# Patient Record
Sex: FEMALE | Race: BLACK OR AFRICAN AMERICAN | NOT HISPANIC OR LATINO
[De-identification: names, ages, dates, MRNs, and addresses within clinical notes are randomized per-mention and may not be internally consistent; named-entity substitution may affect disease eponyms.]

---

## 2017-10-26 ENCOUNTER — APPOINTMENT (OUTPATIENT)
Dept: OPHTHALMOLOGY | Facility: CLINIC | Age: 67
End: 2017-10-26
Payer: MEDICARE

## 2017-10-26 DIAGNOSIS — H04.123 DRY EYE SYNDROME OF BILATERAL LACRIMAL GLANDS: ICD-10-CM

## 2017-10-26 PROCEDURE — 92014 COMPRE OPH EXAM EST PT 1/>: CPT

## 2018-01-06 ENCOUNTER — EMERGENCY (EMERGENCY)
Facility: HOSPITAL | Age: 68
LOS: 1 days | Discharge: HOME | End: 2018-01-06
Attending: EMERGENCY MEDICINE | Admitting: EMERGENCY MEDICINE
Payer: MEDICARE

## 2018-01-06 VITALS
DIASTOLIC BLOOD PRESSURE: 97 MMHG | SYSTOLIC BLOOD PRESSURE: 176 MMHG | OXYGEN SATURATION: 100 % | HEART RATE: 94 BPM | RESPIRATION RATE: 18 BRPM | TEMPERATURE: 99 F

## 2018-01-06 VITALS
DIASTOLIC BLOOD PRESSURE: 90 MMHG | TEMPERATURE: 98 F | SYSTOLIC BLOOD PRESSURE: 171 MMHG | RESPIRATION RATE: 18 BRPM | HEART RATE: 88 BPM | OXYGEN SATURATION: 99 %

## 2018-01-06 LAB
ALBUMIN SERPL ELPH-MCNC: 4 G/DL — SIGNIFICANT CHANGE UP (ref 3.3–5)
ALP SERPL-CCNC: 58 U/L — SIGNIFICANT CHANGE UP (ref 40–120)
ALT FLD-CCNC: 23 U/L RC — SIGNIFICANT CHANGE UP (ref 10–45)
ANION GAP SERPL CALC-SCNC: 15 MMOL/L — SIGNIFICANT CHANGE UP (ref 5–17)
AST SERPL-CCNC: 25 U/L — SIGNIFICANT CHANGE UP (ref 10–40)
BASOPHILS # BLD AUTO: 0.1 K/UL — SIGNIFICANT CHANGE UP (ref 0–0.2)
BASOPHILS NFR BLD AUTO: 2.1 % — HIGH (ref 0–2)
BILIRUB SERPL-MCNC: 0.2 MG/DL — SIGNIFICANT CHANGE UP (ref 0.2–1.2)
BUN SERPL-MCNC: 42 MG/DL — HIGH (ref 7–23)
CALCIUM SERPL-MCNC: 10 MG/DL — SIGNIFICANT CHANGE UP (ref 8.4–10.5)
CHLORIDE SERPL-SCNC: 103 MMOL/L — SIGNIFICANT CHANGE UP (ref 96–108)
CK MB CFR SERPL CALC: 2.8 NG/ML — SIGNIFICANT CHANGE UP (ref 0–3.8)
CK SERPL-CCNC: 86 U/L — SIGNIFICANT CHANGE UP (ref 25–170)
CO2 SERPL-SCNC: 22 MMOL/L — SIGNIFICANT CHANGE UP (ref 22–31)
CREAT SERPL-MCNC: 2.36 MG/DL — HIGH (ref 0.5–1.3)
EOSINOPHIL # BLD AUTO: 0.3 K/UL — SIGNIFICANT CHANGE UP (ref 0–0.5)
EOSINOPHIL NFR BLD AUTO: 7.1 % — HIGH (ref 0–6)
GLUCOSE SERPL-MCNC: 99 MG/DL — SIGNIFICANT CHANGE UP (ref 70–99)
HCT VFR BLD CALC: 34.2 % — LOW (ref 34.5–45)
HGB BLD-MCNC: 11.7 G/DL — SIGNIFICANT CHANGE UP (ref 11.5–15.5)
LYMPHOCYTES # BLD AUTO: 1.4 K/UL — SIGNIFICANT CHANGE UP (ref 1–3.3)
LYMPHOCYTES # BLD AUTO: 35.1 % — SIGNIFICANT CHANGE UP (ref 13–44)
MCHC RBC-ENTMCNC: 30 PG — SIGNIFICANT CHANGE UP (ref 27–34)
MCHC RBC-ENTMCNC: 34.2 GM/DL — SIGNIFICANT CHANGE UP (ref 32–36)
MCV RBC AUTO: 87.7 FL — SIGNIFICANT CHANGE UP (ref 80–100)
MONOCYTES # BLD AUTO: 0.6 K/UL — SIGNIFICANT CHANGE UP (ref 0–0.9)
MONOCYTES NFR BLD AUTO: 14.1 % — HIGH (ref 2–14)
NEUTROPHILS # BLD AUTO: 1.7 K/UL — LOW (ref 1.8–7.4)
NEUTROPHILS NFR BLD AUTO: 41.6 % — LOW (ref 43–77)
PLATELET # BLD AUTO: 252 K/UL — SIGNIFICANT CHANGE UP (ref 150–400)
POTASSIUM SERPL-MCNC: 4.5 MMOL/L — SIGNIFICANT CHANGE UP (ref 3.5–5.3)
POTASSIUM SERPL-SCNC: 4.5 MMOL/L — SIGNIFICANT CHANGE UP (ref 3.5–5.3)
PROT SERPL-MCNC: 7.3 G/DL — SIGNIFICANT CHANGE UP (ref 6–8.3)
RBC # BLD: 3.9 M/UL — SIGNIFICANT CHANGE UP (ref 3.8–5.2)
RBC # FLD: 13.3 % — SIGNIFICANT CHANGE UP (ref 10.3–14.5)
SODIUM SERPL-SCNC: 140 MMOL/L — SIGNIFICANT CHANGE UP (ref 135–145)
TROPONIN T SERPL-MCNC: <0.01 NG/ML — SIGNIFICANT CHANGE UP (ref 0–0.06)
WBC # BLD: 4.1 K/UL — SIGNIFICANT CHANGE UP (ref 3.8–10.5)
WBC # FLD AUTO: 4.1 K/UL — SIGNIFICANT CHANGE UP (ref 3.8–10.5)

## 2018-01-06 PROCEDURE — 73130 X-RAY EXAM OF HAND: CPT | Mod: 26,LT

## 2018-01-06 PROCEDURE — 82553 CREATINE MB FRACTION: CPT

## 2018-01-06 PROCEDURE — 99285 EMERGENCY DEPT VISIT HI MDM: CPT | Mod: 25

## 2018-01-06 PROCEDURE — 73130 X-RAY EXAM OF HAND: CPT

## 2018-01-06 PROCEDURE — 99285 EMERGENCY DEPT VISIT HI MDM: CPT

## 2018-01-06 PROCEDURE — 93005 ELECTROCARDIOGRAM TRACING: CPT

## 2018-01-06 PROCEDURE — 82550 ASSAY OF CK (CPK): CPT

## 2018-01-06 PROCEDURE — 85027 COMPLETE CBC AUTOMATED: CPT

## 2018-01-06 PROCEDURE — 84484 ASSAY OF TROPONIN QUANT: CPT

## 2018-01-06 PROCEDURE — 80053 COMPREHEN METABOLIC PANEL: CPT

## 2018-01-06 RX ORDER — ACETAMINOPHEN 500 MG
650 TABLET ORAL ONCE
Qty: 0 | Refills: 0 | Status: COMPLETED | OUTPATIENT
Start: 2018-01-06 | End: 2018-01-06

## 2018-01-06 RX ADMIN — Medication 650 MILLIGRAM(S): at 16:34

## 2018-01-06 NOTE — ED PROVIDER NOTE - MEDICAL DECISION MAKING DETAILS
67F, PMH of liver cancer, HTN presenting with recurrent left hand pain. patient has history of OA. concern for arthritic pain. low concern for cardiac etiology, however, patient complaining of some intermittent chest pain. plan for wrist xray, cardiac enzymes, ekg. will reassess. 67F, PMH of liver cancer, HTN presenting with recurrent left hand pain. patient has history of OA. concern for arthritic pain. low concern for cardiac etiology, however, patient complaining of some intermittent chest pain. plan for wrist xray, cardiac enzymes, ekg. will reassess.  Attending Statement: Agree with the above.  Acute on chronic symptoms that seem more c/w cervical radiculopathy on hx and exam.  Given age, h/o DM and HTN will obtain ECG and trop x 1 (assuming WNL).  Lytes to eval for hyperkalemia.  Pain control.  Wrist XR to eval for basal joint OA.  Hopeful d/c assuming benign w/u and symptoms improve.  --BMM

## 2018-01-06 NOTE — ED ADULT NURSE NOTE - PSH
bunionectomy  right 2010, left 2011  H/O ventral hernia repair  2001  hysterectomy  1993  Liver disease  liver resection secondary to nonhodgkin lymphoma 1999  S/P breast lumpectomy  right 1995

## 2018-01-06 NOTE — ED ADULT NURSE NOTE - PMH
Gout    hyperparathyroidism    Hypertension    Lymphoma  nonhodgkin chemotherapy 2000  Seasonal allergies

## 2018-01-06 NOTE — ED PROVIDER NOTE - OBJECTIVE STATEMENT
67F, PMH of kidney disease, HTN and liver cancer presenting with two days of intermittent left wrist pain. She previously had similar pain in 2015, workup showed hyperkalemia. Current pain is intermittent, dull and achy, radiates up her arm. Denies any trauma, difficulty breathing, nausea/vomiting, abdominal pain, pain or swelling in lower extremities.

## 2018-01-06 NOTE — ED ADULT NURSE NOTE - DISCHARGE TEACHING
follow up with pcp/ ortho and cardiologist. Return for worsening s.s. Patient verbalized understanding of d.c instructions.

## 2018-01-06 NOTE — ED PROVIDER NOTE - PLAN OF CARE
Please follow-up with your primary care doctor in the next 24-48 hours   Please follow-up with your cardiologist in the next week   If you have any chest pain, difficulty breathing or nausea please return to the emergency department LUE

## 2018-01-06 NOTE — ED ADULT NURSE NOTE - OBJECTIVE STATEMENT
68 y/o female presenting to the ED via walking in complaining of left hand pain x yesterday. Per patient pain developed suddenly yesterday, started in left hand  and radiated into left elbow and shoulder. Per patient pain has subsided. Patient states was hospitalized in 2015 for hyperkalemia and taken off of her medications including allopurinol. Hx od arthritis. No swelling noted. Positive ROM. Positive sensation and pulses. No tenderness on palpation. A&OX3. Safety and comfort measures provided. Family at bedside.

## 2018-01-06 NOTE — ED PROVIDER NOTE - CARE PLAN
Instructions for follow-up, activity and diet:	Please follow-up with your primary care doctor in the next 24-48 hours   Please follow-up with your cardiologist in the next week   If you have any chest pain, difficulty breathing or nausea please return to the emergency department Principal Discharge DX:	Cervical radiculopathy  Goal:	LUE  Instructions for follow-up, activity and diet:	Please follow-up with your primary care doctor in the next 24-48 hours   Please follow-up with your cardiologist in the next week   If you have any chest pain, difficulty breathing or nausea please return to the emergency department

## 2018-01-06 NOTE — ED PROVIDER NOTE - PROGRESS NOTE DETAILS
patient signed out to me by Dr. Yang, pending CMP and troponin.  As per Dr. Yang, pain more consistent with radiculopathy.  has been occurring for weeks worsening over the past 2 days.  pt pending cmp, troponin and reassessment    DARLING Garcia MD patient updated on lab results. she is aware of declining kidney function. patient follows with a cardiologist (Dr. Emmanuelle Mortensen) and believes she had a negative stress test last year. patient reports symptoms have improved. - resident Ck Mahmood troponin negative, lytes unremarkable.  Cr 2.36, patient has known CKD unaware of most recent Cr in 11/2017.  As per records from 2015 patient had Cr fluctuating between 1.7-2.2.  pt made aware of todays results and to follow-up with PMD as well as husbands cardiologist.  on re-eval  no midline cervical ttp, full cervical spine ROM,  s1s2, cta, 5/5 strenght of b/l UE.  pain reproduced with ranging of LUE.  Equivocal spurling's test.  pt states pain improved with ED acetaminophen.  To use acetaminophen for pain.  Return precautions and follow-up provided.      DARLING Garcia MD

## 2018-01-06 NOTE — ED ADULT NURSE NOTE - PRO INTERPRETER NEED 2
English conducted a detailed discussion... I had a detailed discussion with the patient and/or guardian regarding the historical points, exam findings, and any diagnostic results supporting the discharge/admit diagnosis.

## 2018-01-06 NOTE — ED PROVIDER NOTE - PHYSICAL EXAMINATION
General: well appearing female in no acute distress   Respiratory: normal work of breathing  Cardiac: regular rate and rhythm   MSK: no swelling or tenderness of lower extremities, no tenderness to palpation of left wrist or shoulder General: well appearing female in no acute distress   Respiratory: normal work of breathing  Cardiac: regular rate and rhythm   MSK: no swelling or tenderness of lower extremities, no tenderness to palpation of left wrist or shoulder.  +spurling to L  Neuro:  No sensory deficits, 5/5 str throughout, spurling as above

## 2018-05-14 ENCOUNTER — EMERGENCY (EMERGENCY)
Facility: HOSPITAL | Age: 68
LOS: 1 days | End: 2018-05-14
Attending: EMERGENCY MEDICINE
Payer: MEDICARE

## 2018-05-14 VITALS
HEART RATE: 95 BPM | TEMPERATURE: 99 F | SYSTOLIC BLOOD PRESSURE: 207 MMHG | OXYGEN SATURATION: 100 % | RESPIRATION RATE: 18 BRPM | DIASTOLIC BLOOD PRESSURE: 101 MMHG

## 2018-05-14 LAB
ALBUMIN SERPL ELPH-MCNC: 4.4 G/DL — SIGNIFICANT CHANGE UP (ref 3.3–5)
ALP SERPL-CCNC: 55 U/L — SIGNIFICANT CHANGE UP (ref 40–120)
ALT FLD-CCNC: 19 U/L — SIGNIFICANT CHANGE UP (ref 10–45)
ANION GAP SERPL CALC-SCNC: 16 MMOL/L — SIGNIFICANT CHANGE UP (ref 5–17)
APTT BLD: 26.1 SEC — LOW (ref 27.5–37.4)
AST SERPL-CCNC: 26 U/L — SIGNIFICANT CHANGE UP (ref 10–40)
BASOPHILS # BLD AUTO: 0.1 K/UL — SIGNIFICANT CHANGE UP (ref 0–0.2)
BASOPHILS NFR BLD AUTO: 2 % — SIGNIFICANT CHANGE UP (ref 0–2)
BILIRUB SERPL-MCNC: 0.2 MG/DL — SIGNIFICANT CHANGE UP (ref 0.2–1.2)
BUN SERPL-MCNC: 56 MG/DL — HIGH (ref 7–23)
CALCIUM SERPL-MCNC: 10.4 MG/DL — SIGNIFICANT CHANGE UP (ref 8.4–10.5)
CHLORIDE SERPL-SCNC: 101 MMOL/L — SIGNIFICANT CHANGE UP (ref 96–108)
CO2 SERPL-SCNC: 21 MMOL/L — LOW (ref 22–31)
CREAT SERPL-MCNC: 2.47 MG/DL — HIGH (ref 0.5–1.3)
EOSINOPHIL # BLD AUTO: 0.2 K/UL — SIGNIFICANT CHANGE UP (ref 0–0.5)
EOSINOPHIL NFR BLD AUTO: 4 % — SIGNIFICANT CHANGE UP (ref 0–6)
GLUCOSE SERPL-MCNC: 108 MG/DL — HIGH (ref 70–99)
HCT VFR BLD CALC: 34.5 % — SIGNIFICANT CHANGE UP (ref 34.5–45)
HGB BLD-MCNC: 11.6 G/DL — SIGNIFICANT CHANGE UP (ref 11.5–15.5)
INR BLD: 0.99 RATIO — SIGNIFICANT CHANGE UP (ref 0.88–1.16)
LYMPHOCYTES # BLD AUTO: 2 K/UL — SIGNIFICANT CHANGE UP (ref 1–3.3)
LYMPHOCYTES # BLD AUTO: 31.6 % — SIGNIFICANT CHANGE UP (ref 13–44)
MCHC RBC-ENTMCNC: 29.3 PG — SIGNIFICANT CHANGE UP (ref 27–34)
MCHC RBC-ENTMCNC: 33.7 GM/DL — SIGNIFICANT CHANGE UP (ref 32–36)
MCV RBC AUTO: 86.7 FL — SIGNIFICANT CHANGE UP (ref 80–100)
MONOCYTES # BLD AUTO: 0.5 K/UL — SIGNIFICANT CHANGE UP (ref 0–0.9)
MONOCYTES NFR BLD AUTO: 8 % — SIGNIFICANT CHANGE UP (ref 2–14)
NEUTROPHILS # BLD AUTO: 3.4 K/UL — SIGNIFICANT CHANGE UP (ref 1.8–7.4)
NEUTROPHILS NFR BLD AUTO: 54.5 % — SIGNIFICANT CHANGE UP (ref 43–77)
PLATELET # BLD AUTO: 257 K/UL — SIGNIFICANT CHANGE UP (ref 150–400)
POTASSIUM SERPL-MCNC: 4.8 MMOL/L — SIGNIFICANT CHANGE UP (ref 3.5–5.3)
POTASSIUM SERPL-SCNC: 4.8 MMOL/L — SIGNIFICANT CHANGE UP (ref 3.5–5.3)
PROT SERPL-MCNC: 7.6 G/DL — SIGNIFICANT CHANGE UP (ref 6–8.3)
PROTHROM AB SERPL-ACNC: 10.8 SEC — SIGNIFICANT CHANGE UP (ref 9.8–12.7)
RBC # BLD: 3.98 M/UL — SIGNIFICANT CHANGE UP (ref 3.8–5.2)
RBC # FLD: 13.1 % — SIGNIFICANT CHANGE UP (ref 10.3–14.5)
SODIUM SERPL-SCNC: 138 MMOL/L — SIGNIFICANT CHANGE UP (ref 135–145)
TROPONIN T SERPL-MCNC: <0.01 NG/ML — SIGNIFICANT CHANGE UP (ref 0–0.06)
WBC # BLD: 6.2 K/UL — SIGNIFICANT CHANGE UP (ref 3.8–10.5)
WBC # FLD AUTO: 6.2 K/UL — SIGNIFICANT CHANGE UP (ref 3.8–10.5)

## 2018-05-14 PROCEDURE — 99285 EMERGENCY DEPT VISIT HI MDM: CPT | Mod: GC

## 2018-05-14 PROCEDURE — 99220: CPT

## 2018-05-14 PROCEDURE — 70450 CT HEAD/BRAIN W/O DYE: CPT | Mod: 26

## 2018-05-14 PROCEDURE — 93010 ELECTROCARDIOGRAM REPORT: CPT

## 2018-05-14 NOTE — ED ADULT TRIAGE NOTE - CHIEF COMPLAINT QUOTE
blurred vision,slurred speech resolved, hip pain  seen and worked up at [facility] in [place] for tia  signed out ama blurred vision,slurred speech resolved, hip pain  symptoms started around 12pm  seen and worked up at [facility] in [place] for tia  recently returned from cruise visited multiple countries  signed out ama

## 2018-05-14 NOTE — ED ADULT NURSE NOTE - CHIEF COMPLAINT QUOTE
blurred vision,slurred speech resolved, hip pain  symptoms started around 12pm  seen and worked up at [facility] in [place] for tia  recently returned from cruise visited multiple countries  signed out ama

## 2018-05-14 NOTE — ED PROVIDER NOTE - OBJECTIVE STATEMENT
67 y/o female PMHx HTN on ASA 81mg, kidney disease, hyperparathyroidism, Non-hodgkin's lymphoma treated with chemo, Gout presented to for slurred speech, blurry vision, generalized weakness, and muscle spasm since 1PM after being seen by [facility] for stroke work up and signed out AMA. Patient stated initial symptoms started with right lower extremity muscle spasms then felt generalized weakness with warmth and then had bilateral blurry vision. Patient stated she also had word finding and slurred speech, "couldn't get words together". Had associated nausea but denied emesis. Symptoms last 1 hour and was evaluated for stroke with CT head showing age indeterminate stroke with lacunar infarct suspected in left jaime and left centrum semiovale. Patient signed out AMA and presented to [facility]. Patient currently feels speech remains slurred and feels she has to speak slow to articulate words. Patient also continues to have blurry vision. Had recent travel on a cruise last week. Has not taken BP meds today. Denied SOB, CP, gait disturbances, headache, nausea, vomtiing, abdominal pain, fever chills, facial drooping, paresthesias, numbness    PMD: Dr. Nitin Snow

## 2018-05-14 NOTE — ED PROVIDER NOTE - ATTENDING CONTRIBUTION TO CARE
attending Yina: 68yF h/o HTN on ASA 81mg, renal insufficiency, hyperparathyroidism, Non-hodgkin's lymphoma, Gout presents after resolving episode of slurred speech, blurry vision, generalized weakness, and muscle spasm earlier today. Evaluated at OSH for TIA/stroke workup, recommended admission but patient left AMA. Now with resolved symptoms. On exam, NIHSS 0. Will obtain labs, repeat CTH, neuro evaluation and likely CDU for TIA workup.

## 2018-05-14 NOTE — CONSULT NOTE ADULT - ASSESSMENT
67 y/o female PMHx HTN, kidney disease, hyperparathyroidism, Non-hodgkin's lymphoma treated with chemo - many years back, Gout presented to OSH for slurred speech, blurry vision, generalized weakness, and muscle spasm. Patient stated initial symptoms started with right lower extremity muscle spasms then felt generalized weakness with warmth and then had bilateral blurry vision. Patient stated she also had word finding and slurred speech, "couldn't get words together". Had associated nausea but denied emesis. Symptoms last 1 hour and was evaluated for stroke with CT head showing age indeterminate stroke with lacunar infarct suspected in left jaime and left centrum semiovale. Patient signed out AMA and presented to [facility]. Patient currently feels speech improved but continue to have blurry vision. Had recent travel on a cruise last week. She denied numbness, weakness, change in speech and voice. Neurological examination was unremarkable.     Impression     r/o acute stroke     Plan     Permissive HTN x 24hrs goal -120  MRI brain w/o cont  MRA brain w/o cont   MRA neck w/o cont  HgA1c  Lipid profile  ASA 81mg  Atorvastatin 80 mg   TTE as outpatient   Telemetry monitoring  DVT prophylaxis

## 2018-05-14 NOTE — CONSULT NOTE ADULT - ATTENDING COMMENTS
I have seen and examined this patient with the stroke neurology team.     History was reviewed with the patient and/or available family members.   ROS: All negative except documented in the HPI.   Neurological exam was performed and agree with exam as documented above.   Laboratory results and imaging studies were reviewed by me.   I agree with the neurology resident note as documented above.    68 years old woman with multiple vascular risk factors is evaluated at [facility] for symptoms concerning for "TIA". On 5/14, she reports to have developed acute onset of lightheadedness, generalized sweating with progression to presyncope. At the same time, she also reports to have an episode of language disturbance described as word finding difficulties and dysarthria lasting for about one and half hours. She initially presented to OSH but subsequently left AMA and presented to [facility] for further evaluation. Neurological examination today is nonfocal. 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 MRA head and neck did not show any evidence of significant intracranial or extracranial cerebral large vessel severe stenosis or occlusion but incidentally showed 4 mm ACOM aneurysm.    Impression:  Her presentation with language disturbance/aphasia is consistent with transient left hemispheric dysfunction likely secondary to transient ischemic attack in the left MCA distribution - likely etiology being embolism from a proximal source like cardiac/paradoxical source of embolism    Plan:  - Aspirin and clopidogrel for aggressive secondary stroke prevention for 3 weeks followed by single antiplatelet agent   - Atorvastatin 80 mg at bedtime; titrate the dose according to LDL  - HbA1C and LDL, continue with aggressive vascular risk factors modifications   - TTE with bubble study prolonged cardiac monitoring with 30 days event monitor to screen for occult cardiac arrhythmia like atrial fibrillation being the cause of possible cardiac source of embolism to be performed as outpatient   - Would likely benefit from repeat MRA head and neck in about 6-12 months to followup on the size of 4 mm ACOM aneurysm, which would optimally be treated at this time with conservative/expectant management     Above mentioned plan was discussed with patient and available family member in detail. All the questions were answered and concerns were addressed.

## 2018-05-14 NOTE — CONSULT NOTE ADULT - SUBJECTIVE AND OBJECTIVE BOX
HPI:    69 y/o female PMHx HTN, kidney disease, hyperparathyroidism, Non-hodgkin's lymphoma treated with chemo - many years back, Gout presented to OSH for slurred speech, blurry vision, generalized weakness, and muscle spasm. Patient stated initial symptoms started with right lower extremity muscle spasms then felt generalized weakness with warmth and then had bilateral blurry vision. Patient stated she also had word finding and slurred speech, "couldn't get words together". Had associated nausea but denied emesis. Symptoms last 1 hour and was evaluated for stroke with CT head showing age indeterminate stroke with lacunar infarct suspected in left jaime and left centrum semiovale. Patient signed out AMA and presented to [facility]. Patient currently feels speech improved but continue to have blurry vision. Had recent travel on a cruise last week. She denied numbness, weakness, change in speech and voice.     MEDICATIONS  (STANDING):    MEDICATIONS  (PRN):      PAST MEDICAL & SURGICAL HISTORY:  Seasonal allergies  Lymphoma: nonhodgkin chemotherapy 2000  Gout  Hypertension  hyperparathyroidism  bunionectomy: right 2010, left 2011  hysterectomy: 1993  H/O ventral hernia repair: 2001  S/P breast lumpectomy: right 1995  Liver disease: liver resection secondary to nonhodgkin lymphoma 1999      FAMILY HISTORY:  Family history of early CAD (Sibling)  Family history of myocardial infarction (Father)  Family history of essential hypertension (Father, Mother, Sibling)      Allergies    No Known Allergies    Intolerances          SHx - No smoking, No ETOH, No drug abuse      Review of Systems:  CONSTITUTIONAL:  No weight loss, fever, chills, weakness or fatigue.  HEENT:  Eyes:  No visual loss, blurred vision, double vision or yellow sclerae. Ears, Nose, Throat:  No hearing loss, sneezing, congestion, runny nose or sore throat.  SKIN:  No rash or itching.  CARDIOVASCULAR:  No chest pain, chest pressure or chest discomfort. No palpitations or edema.  RESPIRATORY:  No shortness of breath, cough or sputum.  GASTROINTESTINAL:  No anorexia, nausea, vomiting or diarrhea. No abdominal pain or blood.  GENITOURINARY:  NO Burning on urination.   NEUROLOGICAL: See HPI  MUSCULOSKELETAL:  No muscle, back pain, joint pain or stiffness.  HEMATOLOGIC:  No anemia, bleeding or bruising.  LYMPHATICS:  No enlarged nodes. No history of splenectomy.  PSYCHIATRIC:  No history of depression or anxiety.  ENDOCRINOLOGIC:  No reports of sweating, cold or heat intolerance. No polyuria or polydipsia.  ALLERGIES:  No history of asthma, hives, eczema or rhinitis.        Vital Signs Last 24 Hrs  T(C): 36.8 (14 May 2018 21:00), Max: 37 (14 May 2018 19:50)  T(F): 98.2 (14 May 2018 21:00), Max: 98.6 (14 May 2018 19:50)  HR: 74 (14 May 2018 21:00) (74 - 95)  BP: 185/99 (14 May 2018 21:00) (185/99 - 207/101)  BP(mean): --  RR: 18 (14 May 2018 21:00) (18 - 18)  SpO2: 100% (14 May 2018 21:00) (100% - 100%)    General Exam:   General appearance: No acute distress                   Neurological Exam:    Mental Status: Orientated to self, date and place.  Attention intact.  No dysarthria, aphasia or neglect.  Cranial Nerves: PERRL, EOMI, CN V1-3 intact to light touch and pinprick.  No facial asymmetry, Tongue midline.    Motor:   Tone: normal.                  Strength: intact without drifts     Pronator drift: none                 Dysmetria: None to finger-nose-finger   No truncal ataxia.    Tremor: No resting, postural or action tremor.  No myoclonus.  Sensation: intact to light touch  Deep Tendon Reflexes: 1+ bilateral biceps, triceps, brachioradialis, knee   Toes flexor bilaterally  Gait: normal and stable.      Other:    05-14    138  |  101  |  56<H>  ----------------------------<  108<H>  4.8   |  21<L>  |  2.47<H>    Ca    10.4      14 May 2018 21:46    TPro  7.6  /  Alb  4.4  /  TBili  0.2  /  DBili  x   /  AST  26  /  ALT  19  /  AlkPhos  55  05-14 05-14    138  |  101  |  56<H>  ----------------------------<  108<H>  4.8   |  21<L>  |  2.47<H>    Ca    10.4      14 May 2018 21:46    TPro  7.6  /  Alb  4.4  /  TBili  0.2  /  DBili  x   /  AST  26  /  ALT  19  /  AlkPhos  55  05-14                          11.6   6.2   )-----------( 257      ( 14 May 2018 21:46 )             34.5       Radiology    Ct head : OSH     age indeterminant infarction in left jaime and left centrum semiovale HPI:    67 y/o female PMHx HTN, kidney disease, hyperparathyroidism, Non-hodgkin's lymphoma treated with chemo - many years back, Gout presented to OSH for slurred speech, blurry vision, generalized weakness, and muscle spasm. Patient stated initial symptoms started with right lower extremity muscle spasms then felt generalized weakness with warmth and then had bilateral blurry vision. Patient stated she also had word finding and slurred speech, "couldn't get words together". Had associated nausea but denied emesis. Symptoms last 1 hour and was evaluated for stroke with CT head showing age indeterminate stroke with lacunar infarct suspected in left jaime and left centrum semiovale. Patient signed out AMA and presented to [facility]. Patient currently feels speech improved but continue to have blurry vision. Had recent travel on a cruise last week. She denied numbness, weakness, change in speech and voice. Her current NIHSS is 0 and MRS is 0. 	    MEDICATIONS  (STANDING):    MEDICATIONS  (PRN):      PAST MEDICAL & SURGICAL HISTORY:  Seasonal allergies  Lymphoma: nonhodgkin chemotherapy 2000  Gout  Hypertension  hyperparathyroidism  bunionectomy: right 2010, left 2011  hysterectomy: 1993  H/O ventral hernia repair: 2001  S/P breast lumpectomy: right 1995  Liver disease: liver resection secondary to nonhodgkin lymphoma 1999      FAMILY HISTORY:  Family history of early CAD (Sibling)  Family history of myocardial infarction (Father)  Family history of essential hypertension (Father, Mother, Sibling)      Allergies    No Known Allergies    Intolerances          SHx - No smoking, No ETOH, No drug abuse      Review of Systems:  CONSTITUTIONAL:  No weight loss, fever, chills, weakness or fatigue.  HEENT:  Eyes:  No visual loss, blurred vision, double vision or yellow sclerae. Ears, Nose, Throat:  No hearing loss, sneezing, congestion, runny nose or sore throat.  SKIN:  No rash or itching.  CARDIOVASCULAR:  No chest pain, chest pressure or chest discomfort. No palpitations or edema.  RESPIRATORY:  No shortness of breath, cough or sputum.  GASTROINTESTINAL:  No anorexia, nausea, vomiting or diarrhea. No abdominal pain or blood.  GENITOURINARY:  NO Burning on urination.   NEUROLOGICAL: See HPI  MUSCULOSKELETAL:  No muscle, back pain, joint pain or stiffness.  HEMATOLOGIC:  No anemia, bleeding or bruising.  LYMPHATICS:  No enlarged nodes. No history of splenectomy.  PSYCHIATRIC:  No history of depression or anxiety.  ENDOCRINOLOGIC:  No reports of sweating, cold or heat intolerance. No polyuria or polydipsia.  ALLERGIES:  No history of asthma, hives, eczema or rhinitis.        Vital Signs Last 24 Hrs  T(C): 36.8 (14 May 2018 21:00), Max: 37 (14 May 2018 19:50)  T(F): 98.2 (14 May 2018 21:00), Max: 98.6 (14 May 2018 19:50)  HR: 74 (14 May 2018 21:00) (74 - 95)  BP: 185/99 (14 May 2018 21:00) (185/99 - 207/101)  BP(mean): --  RR: 18 (14 May 2018 21:00) (18 - 18)  SpO2: 100% (14 May 2018 21:00) (100% - 100%)    General Exam:   General appearance: No acute distress                   Neurological Exam:    Mental Status: Orientated to self, date and place.  Attention intact.  No dysarthria, aphasia or neglect.  Cranial Nerves: PERRL, EOMI, CN V1-3 intact to light touch and pinprick.  No facial asymmetry, Tongue midline.    Motor:   Tone: normal.                  Strength: intact without drifts     Pronator drift: none                 Dysmetria: None to finger-nose-finger   No truncal ataxia.    Tremor: No resting, postural or action tremor.  No myoclonus.  Sensation: intact to light touch  Deep Tendon Reflexes: 1+ bilateral biceps, triceps, brachioradialis, knee   Toes flexor bilaterally  Gait: normal and stable.      Other:    05-14    138  |  101  |  56<H>  ----------------------------<  108<H>  4.8   |  21<L>  |  2.47<H>    Ca    10.4      14 May 2018 21:46    TPro  7.6  /  Alb  4.4  /  TBili  0.2  /  DBili  x   /  AST  26  /  ALT  19  /  AlkPhos  55  05-14 05-14    138  |  101  |  56<H>  ----------------------------<  108<H>  4.8   |  21<L>  |  2.47<H>    Ca    10.4      14 May 2018 21:46    TPro  7.6  /  Alb  4.4  /  TBili  0.2  /  DBili  x   /  AST  26  /  ALT  19  /  AlkPhos  55  05-14                          11.6   6.2   )-----------( 257      ( 14 May 2018 21:46 )             34.5       Radiology    Ct head : OSH     age indeterminant infarction in left jaime and left centrum semiovale

## 2018-05-14 NOTE — ED PROVIDER NOTE - PROGRESS NOTE DETAILS
ANA Kinney: Paged neuro ANA Kinney: Neuro at bedside ANA Kinney: Discussed with neuro patient stable for CDU for MRI in AM without contrast as patient has decreased renal fxn. Ordered MR brain without, MRA head without, MRA neck without

## 2018-05-14 NOTE — ED ADULT NURSE NOTE - OBJECTIVE STATEMENT
69y/o female with history of 69y/o female with history of Lymphoma, HTN, walked into ED a&ox3 c/o blurry vision. Patient reports she was at [facility] with her  who is a pasture, when she suddenly began to feel hot, weak and nauseous. Shortly after, she felt "spasm" to right hip and developed blurry vision. States "I couldn't form sentences, I felt like I was slurring/stuttering." patient was taken down to the ED where she received EKG, chest x-ray and head CT, diagnosed with TIA. Patient reports they wanted her to stay in Hospital for 24hr observation but she wanted to come to [facility] since she used to work as  here. Currently reports most of her symptoms have subsided but her speech still doesn't feel 100% and she has some mild blurry vision. Reports feeling much better and denies any other complaints. No HA, neck pain, numbness/tingling, CP, SOB, weakness. Lungs clear b/l. PERRL, gross neuro intact. Equal strength and sensation in extremities x4. Hypertensive in ED. EKG done, patient on CM. Safety and comfort maintained.

## 2018-05-15 VITALS
DIASTOLIC BLOOD PRESSURE: 79 MMHG | SYSTOLIC BLOOD PRESSURE: 149 MMHG | TEMPERATURE: 98 F | OXYGEN SATURATION: 99 % | HEART RATE: 76 BPM | RESPIRATION RATE: 17 BRPM

## 2018-05-15 LAB
CHOLEST SERPL-MCNC: 269 MG/DL — HIGH (ref 10–199)
HBA1C BLD-MCNC: 5.8 % — HIGH (ref 4–5.6)
HDLC SERPL-MCNC: 140 MG/DL — HIGH (ref 40–125)
LIPID PNL WITH DIRECT LDL SERPL: 120 MG/DL — SIGNIFICANT CHANGE UP
TOTAL CHOLESTEROL/HDL RATIO MEASUREMENT: 1.9 RATIO — LOW (ref 3.3–7.1)
TRIGL SERPL-MCNC: 44 MG/DL — SIGNIFICANT CHANGE UP (ref 10–149)

## 2018-05-15 PROCEDURE — 93005 ELECTROCARDIOGRAM TRACING: CPT

## 2018-05-15 PROCEDURE — 99284 EMERGENCY DEPT VISIT MOD MDM: CPT | Mod: 25

## 2018-05-15 PROCEDURE — 80061 LIPID PANEL: CPT

## 2018-05-15 PROCEDURE — 85610 PROTHROMBIN TIME: CPT

## 2018-05-15 PROCEDURE — 82962 GLUCOSE BLOOD TEST: CPT

## 2018-05-15 PROCEDURE — 84484 ASSAY OF TROPONIN QUANT: CPT

## 2018-05-15 PROCEDURE — 85027 COMPLETE CBC AUTOMATED: CPT

## 2018-05-15 PROCEDURE — 70551 MRI BRAIN STEM W/O DYE: CPT | Mod: 26

## 2018-05-15 PROCEDURE — 80053 COMPREHEN METABOLIC PANEL: CPT

## 2018-05-15 PROCEDURE — 85730 THROMBOPLASTIN TIME PARTIAL: CPT

## 2018-05-15 PROCEDURE — 70544 MR ANGIOGRAPHY HEAD W/O DYE: CPT

## 2018-05-15 PROCEDURE — 99217: CPT

## 2018-05-15 PROCEDURE — G0378: CPT

## 2018-05-15 PROCEDURE — 70450 CT HEAD/BRAIN W/O DYE: CPT

## 2018-05-15 PROCEDURE — 70547 MR ANGIOGRAPHY NECK W/O DYE: CPT | Mod: 26

## 2018-05-15 PROCEDURE — 70544 MR ANGIOGRAPHY HEAD W/O DYE: CPT | Mod: 26,59

## 2018-05-15 PROCEDURE — 70551 MRI BRAIN STEM W/O DYE: CPT

## 2018-05-15 PROCEDURE — 70547 MR ANGIOGRAPHY NECK W/O DYE: CPT

## 2018-05-15 PROCEDURE — 83036 HEMOGLOBIN GLYCOSYLATED A1C: CPT

## 2018-05-15 RX ORDER — ASPIRIN/CALCIUM CARB/MAGNESIUM 324 MG
1 TABLET ORAL
Qty: 30 | Refills: 0
Start: 2018-05-15

## 2018-05-15 RX ORDER — ATORVASTATIN CALCIUM 80 MG/1
80 TABLET, FILM COATED ORAL AT BEDTIME
Qty: 0 | Refills: 0 | Status: DISCONTINUED | OUTPATIENT
Start: 2018-05-15 | End: 2018-05-18

## 2018-05-15 RX ORDER — METOPROLOL TARTRATE 50 MG
50 TABLET ORAL ONCE
Qty: 0 | Refills: 0 | Status: DISCONTINUED | OUTPATIENT
Start: 2018-05-15 | End: 2018-05-15

## 2018-05-15 RX ORDER — ATORVASTATIN CALCIUM 80 MG/1
1 TABLET, FILM COATED ORAL
Qty: 30 | Refills: 0
Start: 2018-05-15

## 2018-05-15 RX ORDER — ASPIRIN/CALCIUM CARB/MAGNESIUM 324 MG
81 TABLET ORAL DAILY
Qty: 0 | Refills: 0 | Status: DISCONTINUED | OUTPATIENT
Start: 2018-05-15 | End: 2018-05-18

## 2018-05-15 RX ORDER — SODIUM CHLORIDE 9 MG/ML
3 INJECTION INTRAMUSCULAR; INTRAVENOUS; SUBCUTANEOUS EVERY 8 HOURS
Qty: 0 | Refills: 0 | Status: DISCONTINUED | OUTPATIENT
Start: 2018-05-15 | End: 2018-05-18

## 2018-05-15 RX ORDER — METOPROLOL TARTRATE 50 MG
50 TABLET ORAL DAILY
Qty: 0 | Refills: 0 | Status: DISCONTINUED | OUTPATIENT
Start: 2018-05-15 | End: 2018-05-18

## 2018-05-15 RX ORDER — CLOPIDOGREL BISULFATE 75 MG/1
1 TABLET, FILM COATED ORAL
Qty: 21 | Refills: 0 | OUTPATIENT
Start: 2018-05-15 | End: 2018-06-04

## 2018-05-15 RX ADMIN — Medication 50 MILLIGRAM(S): at 00:40

## 2018-05-15 RX ADMIN — SODIUM CHLORIDE 3 MILLILITER(S): 9 INJECTION INTRAMUSCULAR; INTRAVENOUS; SUBCUTANEOUS at 07:04

## 2018-05-15 RX ADMIN — ATORVASTATIN CALCIUM 80 MILLIGRAM(S): 80 TABLET, FILM COATED ORAL at 01:28

## 2018-05-15 NOTE — ED CDU PROVIDER INITIAL DAY NOTE - OBJECTIVE STATEMENT
67 y/o female with PMH HTN, CKD, hyperparathyroidism, Non-hodgkin's lymphoma treated with chemo, and Gout presented to ED for slurred speech, blurry vision, generalized weakness, and muscle spasm since 1PM today. pt states these symptoms started while she was visiting someone at [facility]. Pt was worked up there for stroke and signed out AMA to come here for treatment. Patient stated initial symptoms started with right lower extremity muscle spasms then felt generalized weakness with warmth, followed by bilateral blurry vision. Patient stated she also had slurred speech and "couldn't get words together". Had associated nausea but denied emesis. Symptoms lasted about 2 hours then resolved. pt had CT head showing age indeterminate stroke with lacunar infarct suspected in left jaime and left centrum semiovale. Patient signed out AMA and presented to [facility]. While in ED pt felt speech remained slurred and feels she has to speak slow to articulate words. States vision improved but is still a little blurry. Had recent travel on a cruise last week. Has not taken BP meds today. Denied SOB, CP, gait disturbances, headache, vomiting, abdominal pain, fever chills, facial drooping, paresthesias, numbness  In ED, CT head repeat and was negative, neuro saw pt - recommended CDU for neuro checks and MRI/MRA. Pt now feels all symptoms resolved aside from blurry vision which is much improved; almost back to normal.     PMD/nephro: Dr. Nitin Snow

## 2018-05-15 NOTE — ED CDU PROVIDER INITIAL DAY NOTE - DETAILS
- frequent re-eval  - vitals q 4hrs  - tele  - neurochecks q 4hrs  - MRI/MRA head and neck  - neuro following  - case discussed with attending Dr. Bran

## 2018-05-15 NOTE — ED CDU PROVIDER INITIAL DAY NOTE - FAMILY HISTORY
Father  Still living? No  Family history of essential hypertension, Age at diagnosis: Age Unknown  Family history of myocardial infarction, Age at diagnosis: Age Unknown     Mother  Still living? Unknown  Family history of essential hypertension, Age at diagnosis: Age Unknown     Sibling  Still living? No  Family history of essential hypertension, Age at diagnosis: Age Unknown  Family history of early CAD, Age at diagnosis: Age Unknown

## 2018-05-15 NOTE — ED CDU PROVIDER SUBSEQUENT DAY NOTE - HISTORY
No interval changes since initial CDU provider note. Pt feels well without complaint. states all symptoms resolved, including the blurry vision. NAD VSS. no events on tele. neuro exam normal, no deficits. pt to get MRI/MRA in AM. will continue monitoring.  Kenneth Lewis

## 2018-05-15 NOTE — ED CDU PROVIDER INITIAL DAY NOTE - PMH
Chronic renal insufficiency    Gout    hyperparathyroidism    Hypertension    Lymphoma  nonhodgkin chemotherapy 2000  Seasonal allergies

## 2018-05-15 NOTE — ED ADULT NURSE REASSESSMENT NOTE - GENERAL PATIENT STATE
comfortable appearance/cooperative/smiling/interactive
improvement verbalized/comfortable appearance
smiling/interactive/comfortable appearance/family/SO at bedside

## 2018-05-15 NOTE — ED CDU PROVIDER DISPOSITION NOTE - CLINICAL COURSE
69 y/o female with PMH HTN, CKD, hyperparathyroidism, Non-hodgkin's lymphoma treated with chemo, and Gout presented to ED for slurred speech, blurry vision, generalized weakness, and muscle spasm since 1PM today. pt states these symptoms started while she was visiting someone at [facility]. Pt was worked up there for stroke and signed out AMA to come here for treatment. Patient stated initial symptoms started with right lower extremity muscle spasms then felt generalized weakness with warmth, followed by bilateral blurry vision. Patient stated she also had slurred speech and "couldn't get words together". Had associated nausea but denied emesis. Symptoms lasted about 2 hours then resolved. pt had CT head showing age indeterminate stroke with lacunar infarct suspected in left jaime and left centrum semiovale. Patient signed out AMA and presented to [facility]. While in ED pt felt speech remained slurred and feels she has to speak slow to articulate words. States vision improved but is still a little blurry. Had recent travel on a cruise last week. Has not taken BP meds today. Denied SOB, CP, gait disturbances, headache, vomiting, abdominal pain, fever chills, facial drooping, paresthesias, numbness  In ED, CT head repeat and was negative, neuro saw pt - recommended CDU for neuro checks and MRI/MRA. Pt now feels all symptoms resolved aside from blurry vision which is much improved; almost back to normal. In CDU, MRI/MRA _______________. 69 y/o female with PMH HTN, CKD, hyperparathyroidism, Non-hodgkin's lymphoma treated with chemo, and Gout presented to ED for slurred speech, blurry vision, generalized weakness, and muscle spasm since 1PM today. pt states these symptoms started while she was visiting someone at [facility]. Pt was worked up there for stroke and signed out AMA to come here for treatment. Patient stated initial symptoms started with right lower extremity muscle spasms then felt generalized weakness with warmth, followed by bilateral blurry vision. Patient stated she also had slurred speech and "couldn't get words together". Had associated nausea but denied emesis. Symptoms lasted about 2 hours then resolved. pt had CT head showing age indeterminate stroke with lacunar infarct suspected in left jaime and left centrum semiovale. Patient signed out AMA and presented to [facility]. While in ED pt felt speech remained slurred and feels she has to speak slow to articulate words. States vision improved but is still a little blurry. Had recent travel on a cruise last week. Has not taken BP meds today. Denied SOB, CP, gait disturbances, headache, vomiting, abdominal pain, fever chills, facial drooping, paresthesias, numbness. In ED, CT head repeat and was negative, neuro saw pt - recommended CDU for neuro checks and MRI/MRA. Pt now feels all symptoms resolved aside from blurry vision which is much improved; almost back to normal. In CDU, MRI/MRA MRI Brain No acute or subacute cerebral infarct. MRA BRAIN: 4 x 4 x 4 mm aneurysm off of the anterior communicating   artery. Patient evaluated by Nuerosurgery and Neurology w/ recommendations Continue your home medications as directed. Start Aspirin 81 mg daily, Plavix 75mg daily for 3 weeks, and Atorvastatin 80mg every night. Follow up with Dr. Sharma and Dr. Allison 1 wk. Return to ER for headache, confusion, behavior/speech changes, numbness/tingling/weakness in your arms/legs, or any other concerns.

## 2018-05-15 NOTE — ED CDU PROVIDER SUBSEQUENT DAY NOTE - CRANIAL NERVE AND PUPILLARY EXAM
cranial nerves 2-12 intact/central and peripheral vision intact/extra-ocular movements intact/cough reflex intact/gag reflex intact/tongue is midline

## 2018-05-15 NOTE — ED CDU PROVIDER SUBSEQUENT DAY NOTE - PROGRESS NOTE DETAILS
CDU progress note ANA Lewis: Patient sleeping comfortably now. NAD. VSS. neuro exam at appx 330am was normal, no deficits. will continue monitoring. Patient currently at MRI/MRA for imaging. MRA brain shows 4 x 4 x 4 mm aneurysm off of the anterior communicating artery.   Neurosurgery consulted. Patient is aox3, speaking full coherent sentences with no signs of distress noted.  patient states feeling better and without complaints. Patient evaluated by Nuerosurgery and Neurology w/ recommendations Continue your home medications as directed. Start Aspirin 81 mg daily, Plavix 75mg daily for 3 weeks, and Atorvastatin 80mg every night. Follow up with Dr. Sharma and Dr. Allison 1 wk. Return to ER for headache, confusion, behavior/speech changes, numbness/tingling/weakness in your arms/legs, or any other concerns. Patient is aox3, speaking full coherent sentences with no signs of distress noted. Vitals within normal limits, patient states feeling better and without complaints.   c/d/w Dr. Brown

## 2018-05-15 NOTE — ED ADULT NURSE REASSESSMENT NOTE - ANCILLARY STATUS
MRI results pending/radiology results pending
awaiting radiology/awaiting MRI/ MRA/awaiting lab draw
awaiting radiology

## 2018-05-15 NOTE — ED CDU PROVIDER DISPOSITION NOTE - ATTENDING CONTRIBUTION TO CARE
ED attending Reda note:  Patient re-evaluated and doing well.  No acute issues at  this time.  Lab and radiology tests reviewed with patient and/or family.  Patient stable for discharge.

## 2018-05-15 NOTE — ED CDU PROVIDER INITIAL DAY NOTE - ATTENDING CONTRIBUTION TO CARE
attending Yina: pt with likely TIA, initially evaluated at OSH. On arrival to [facility] NIHSS0. Plan for CDU for tele monitoring, Neuro checks q4H, MRI/MRA head and neck, neuro following and frequent reevaluations.

## 2018-05-15 NOTE — CONSULT NOTE ADULT - SUBJECTIVE AND OBJECTIVE BOX
HPI: Patient is a 68 year old female with a past medical history of HTN on ASA 81 mg daily, chronic kidney disease, hyperparathyroidism,  Non-hodgkin's lymphoma treated with chemo, and gout who was visiting a friend at [facility] yesterday when she  developed sudden onset of slurred speech, blurry vision, generalized weakness, and muscle spasm starting around 1pm yesterday. She was  taken to the Emergency department there where CT head was performed and revealed indeterminate stroke with lacunar infarct suspected in  left jaime and left centrum semiovale. Symptoms resolved after 1 hour. Patient signed out AMA and came straight to [facility]. MRI/A was  performed which revealed chronic microvascular changes and 4x4x4 mm anterior communicating artery aneurysm.     SUBJECTIVE: Patient seen and examined with  at bedside. She states blurred vision and slurred speech have completely resolved. Denies headaches, nausea, vomiting.     Vital Signs Last 24 Hrs  T(C): 36.9 (05-15-18 @ 07:55), Max: 37.1 (05-15-18 @ 01:09)  T(F): 98.4 (05-15-18 @ 07:55), Max: 98.7 (05-15-18 @ 01:09)  HR: 75 (05-15-18 @ 07:55) (74 - 95)  BP: 175/95 (05-15-18 @ 07:55) (157/85 - 207/101)  RR: 17 (05-15-18 @ 07:55) (17 - 19)  SpO2: 100% (05-15-18 @ 07:55) (99% - 100%)    PHYSICAL EXAM:    Constitutional: No Acute Distress, resting comfortably in bed    Neurological: Awake, alert, oriented to person, place and time, speech clear and fluent, face equal, no drift, tongue midline, briskly following commands, moving all extremities with 5/5 strength, sensation intact to light touch throughout, pupils 3mm and reactive bilaterally, extraocular movements intact, no nystagmus      LABS:                          11.6   6.2   )-----------( 257      ( 14 May 2018 21:46 )             34.5    05-14    138  |  101  |  56<H>  ----------------------------<  108<H>  4.8   |  21<L>  |  2.47<H>    Ca    10.4      14 May 2018 21:46    TPro  7.6  /  Alb  4.4  /  TBili  0.2  /  DBili  x   /  AST  26  /  ALT  19  /  AlkPhos  55  05-14  PT/INR - ( 14 May 2018 21:46 )   PT: 10.8 sec;   INR: 0.99 ratio         PTT - ( 14 May 2018 21:46 )  PTT:26.1 sec      IMAGING:     < from: MR Angio Neck No Cont (05.15.18 @ 07:53) >  FINDINGS:     MRI BRAIN:    Intact normal midline structures are demonstrated.The ventricles and   sulci are normal in size and appearance for the patient's age. There is   confluent T2 prolongation in the bilateral periventricular white matter   which is nonspecific, however most likely represents chronic   microvascular disease. Similar confluent foci are also notable within the   jaime.    There is no intraparenchymal hematoma, mass effect or midline shift. No   abnormal extra-axial fluid collections are present. There is no diffusion   abnormality to suggest acute or subacute infarction. A tiny focus of   susceptibility artifact is notable within the central jaime which may   related to a cavernoma or a focus of chronic microhemorrhage.    The calvarium is intact. The visualized intraorbital compartments,   paranasalsinuses and tympanomastoid cavities appear free of acute   disease.    MRA BRAIN:    There is a 4 x 4 x 4 mm aneurysm off of the anterior making artery. The   aneurysm neck measures approximately 2 mm. The aneurysm sac projects   posterosuperiorly. There is hypoplasia of the right A1 segment. No   additional aneurysm of the anterior circulation is identified.     The left posterior communicating artery is noted. The right posterior   communicating artery is not well resolved.    The bilateral intracranial vertebral arteries, basilar artery, posterior   cerebral arteries, posterior communicating arteries, and visualized   cerebellar arteries are unremarkable. The bilateral superior cerebellar   arteries and P1 segments share a common origin.    MRA NECK:    The bilateral common, internal and external carotid arteries and   bilateral vertebral arteries are unremarkable. There is no   hemodynamically significant stenosis.    IMPRESSION:     MRI BRAIN: No acute or subacute cerebral infarct.    Multiple nonspecific confluent abnormal white matter foci of T2/FLAIR   prolongation statistically favoring microvascular disease.    MRA BRAIN: 4 x 4 x 4 mm aneurysm off of the anterior communicating   artery. Recommend serial MR angiographic imaging over time to assess for   stability or change.    MRA NECK: No evidence of carotid or vertebral stenosis in the neck.        < end of copied text >        MEDICATIONS  (STANDING):  aspirin enteric coated 81 milliGRAM(s) Oral daily  atorvastatin 80 milliGRAM(s) Oral at bedtime  metoprolol succinate ER 50 milliGRAM(s) Oral daily  sodium chloride 0.9% lock flush 3 milliLiter(s) IV Push every 8 hours    MEDICATIONS  (PRN):

## 2018-05-15 NOTE — ED ADULT NURSE REASSESSMENT NOTE - NS ED NURSE REASSESS COMMENT FT1
Report given to CDU JAGDISH Bear. Patient aware of bed assignment. Patient currently comfortable. VSS. Awaiting transport to CDU.
report taken from Marianela DUBON. pt awaiting MRI and lipid HgbA1C.
Pt AO4. Neuro check intact. No deficits noted. Safety maintained. Will continue to monitor.
Pt received from JAGDISH Wagner. Pt oriented to CDU & plan of care was discussed. Pt AO4. Neuro check intact. No deficits noted. Pt states all symptoms have resolved. Safety & comfort measures maintained. Call bell in reach. Will continue to monitor.

## 2018-05-15 NOTE — ED CDU PROVIDER SUBSEQUENT DAY NOTE - MEDICAL DECISION MAKING DETAILS
Attending MD Brown: pt with likely TIA, initially evaluated at OSH. On arrival to [facility] NIHSS0. Plan for CDU for tele monitoring, Neuro checks q4H, MRI/MRA head and neck, neuro following and frequent reevaluations.  No complaints at this time.

## 2018-05-15 NOTE — CONSULT NOTE ADULT - ASSESSMENT
HPI: Patient is a 68 year old female with a past medical history of HTN on ASA 81 mg daily, chronic kidney disease, hyperparathyroidism,  Non-hodgkin's lymphoma treated with chemo, and gout who was visiting a friend at [facility] yesterday when she  developed sudden onset of slurred speech, blurry vision, generalized weakness, and muscle spasm starting around 1pm yesterday. She was  taken to the Emergency department there where CT head was performed and revealed indeterminate stroke with lacunar infarct suspected in  left jaime and left centrum semiovale. Symptoms resolved after 1 hour. Patient signed out AMA and came straight to [facility]. MRI/A was  performed which revealed chronic microvascular changes and 4x4x4 mm anterior communicating artery aneurysm.    PLAN:  -No acute neurosurgical intervention at this time.  -Outpatient follow up within 1-2 weeks with vascular neurosurgeon Dr. Saad Allison. Can call [phone] to schedule an appointment.   -Keep normotensive  -Discussed w/ Dr. Allison  -Call with any further questions (pgr#[phone])

## 2018-05-15 NOTE — ED CDU PROVIDER DISPOSITION NOTE - PLAN OF CARE
1. Continue your home medications as directed. Start Atorvastatin 80mg every night.   2. Drink plenty of fluids to stay hydrated.  3. You will need to follow up with your PMD and neurologist or our neurology clinic at [phone] in 2-3 days. A copy of your results were given to you to bring to your appt.   4. Return to ER for headache, confusion, behavior/speech changes, numbness/tingling/weakness in your arms/legs, or any other concerns. 1. Continue your home medications as directed. Start Atorvastatin 80mg every night.   2. Drink plenty of fluids to stay hydrated.  3. Outpatient follow up within 1 weeks with Neurologist Saad Garcia and vascular neurosurgeon Dr. Saad Allison.   4. Return to ER for headache, confusion, behavior/speech changes, numbness/tingling/weakness in your arms/legs, or any other concerns.

## 2018-05-18 NOTE — ED POST DISCHARGE NOTE - DETAILS
Left message on VM to return call -Mary Smith PA-C 05/19/18 approx 1230pm, patient contacted, and informed elevated cholesterol and  respectively. Pt states she is taking Atorvastatin 80mg as prescribed and has follow up with PCP this week. Pt states she is feeling better and will adjust her diet. Patient also informed HgA1C 5.8 pre-diabetes - Jasmeet BLANCHARD

## 2018-06-05 ENCOUNTER — APPOINTMENT (OUTPATIENT)
Dept: NEUROSURGERY | Facility: CLINIC | Age: 68
End: 2018-06-05
Payer: MEDICARE

## 2018-06-05 VITALS
WEIGHT: 166 LBS | HEART RATE: 75 BPM | BODY MASS INDEX: 26.06 KG/M2 | DIASTOLIC BLOOD PRESSURE: 82 MMHG | TEMPERATURE: 98.5 F | SYSTOLIC BLOOD PRESSURE: 165 MMHG | HEIGHT: 67 IN | OXYGEN SATURATION: 93 %

## 2018-06-05 DIAGNOSIS — I67.1 CEREBRAL ANEURYSM, NONRUPTURED: ICD-10-CM

## 2018-06-05 PROCEDURE — 99205 OFFICE O/P NEW HI 60 MIN: CPT

## 2018-06-05 RX ORDER — METOPROLOL TARTRATE 50 MG/1
50 TABLET, FILM COATED ORAL
Qty: 180 | Refills: 0 | Status: ACTIVE | COMMUNITY
Start: 2017-06-28

## 2018-06-05 RX ORDER — ATORVASTATIN CALCIUM 80 MG/1
80 TABLET, FILM COATED ORAL
Qty: 30 | Refills: 0 | Status: ACTIVE | COMMUNITY
Start: 2018-05-15

## 2018-06-06 ENCOUNTER — OTHER (OUTPATIENT)
Age: 68
End: 2018-06-06

## 2018-06-26 ENCOUNTER — OUTPATIENT (OUTPATIENT)
Dept: OUTPATIENT SERVICES | Facility: HOSPITAL | Age: 68
LOS: 1 days | End: 2018-06-26
Payer: MEDICARE

## 2018-06-26 VITALS
OXYGEN SATURATION: 99 % | TEMPERATURE: 98 F | DIASTOLIC BLOOD PRESSURE: 82 MMHG | HEIGHT: 66 IN | SYSTOLIC BLOOD PRESSURE: 162 MMHG | HEART RATE: 71 BPM | RESPIRATION RATE: 16 BRPM | WEIGHT: 153 LBS

## 2018-06-26 DIAGNOSIS — Z98.890 OTHER SPECIFIED POSTPROCEDURAL STATES: Chronic | ICD-10-CM

## 2018-06-26 DIAGNOSIS — Z01.818 ENCOUNTER FOR OTHER PREPROCEDURAL EXAMINATION: ICD-10-CM

## 2018-06-26 DIAGNOSIS — I67.1 CEREBRAL ANEURYSM, NONRUPTURED: ICD-10-CM

## 2018-06-26 DIAGNOSIS — I10 ESSENTIAL (PRIMARY) HYPERTENSION: ICD-10-CM

## 2018-06-26 LAB
ALBUMIN SERPL ELPH-MCNC: 4.5 G/DL — SIGNIFICANT CHANGE UP (ref 3.3–5)
ALP SERPL-CCNC: 92 U/L — SIGNIFICANT CHANGE UP (ref 40–120)
ALT FLD-CCNC: 33 U/L — SIGNIFICANT CHANGE UP (ref 10–45)
ANION GAP SERPL CALC-SCNC: 17 MMOL/L — SIGNIFICANT CHANGE UP (ref 5–17)
AST SERPL-CCNC: 28 U/L — SIGNIFICANT CHANGE UP (ref 10–40)
BILIRUB SERPL-MCNC: 0.2 MG/DL — SIGNIFICANT CHANGE UP (ref 0.2–1.2)
BUN SERPL-MCNC: 62 MG/DL — HIGH (ref 7–23)
CALCIUM SERPL-MCNC: 11 MG/DL — HIGH (ref 8.4–10.5)
CHLORIDE SERPL-SCNC: 102 MMOL/L — SIGNIFICANT CHANGE UP (ref 96–108)
CO2 SERPL-SCNC: 22 MMOL/L — SIGNIFICANT CHANGE UP (ref 22–31)
CREAT SERPL-MCNC: 3.01 MG/DL — HIGH (ref 0.5–1.3)
GLUCOSE SERPL-MCNC: 108 MG/DL — HIGH (ref 70–99)
HCT VFR BLD CALC: 33.1 % — LOW (ref 34.5–45)
HGB BLD-MCNC: 11.5 G/DL — SIGNIFICANT CHANGE UP (ref 11.5–15.5)
MCHC RBC-ENTMCNC: 28.7 PG — SIGNIFICANT CHANGE UP (ref 27–34)
MCHC RBC-ENTMCNC: 34.7 GM/DL — SIGNIFICANT CHANGE UP (ref 32–36)
MCV RBC AUTO: 82.5 FL — SIGNIFICANT CHANGE UP (ref 80–100)
PLATELET # BLD AUTO: 278 K/UL — SIGNIFICANT CHANGE UP (ref 150–400)
POTASSIUM SERPL-MCNC: 4.7 MMOL/L — SIGNIFICANT CHANGE UP (ref 3.5–5.3)
POTASSIUM SERPL-SCNC: 4.7 MMOL/L — SIGNIFICANT CHANGE UP (ref 3.5–5.3)
PROT SERPL-MCNC: 7.5 G/DL — SIGNIFICANT CHANGE UP (ref 6–8.3)
RBC # BLD: 4.01 M/UL — SIGNIFICANT CHANGE UP (ref 3.8–5.2)
RBC # FLD: 15.2 % — HIGH (ref 10.3–14.5)
SODIUM SERPL-SCNC: 141 MMOL/L — SIGNIFICANT CHANGE UP (ref 135–145)
WBC # BLD: 5.43 K/UL — SIGNIFICANT CHANGE UP (ref 3.8–10.5)
WBC # FLD AUTO: 5.43 K/UL — SIGNIFICANT CHANGE UP (ref 3.8–10.5)

## 2018-06-26 PROCEDURE — G0463: CPT

## 2018-06-26 PROCEDURE — 85027 COMPLETE CBC AUTOMATED: CPT

## 2018-06-26 PROCEDURE — 80053 COMPREHEN METABOLIC PANEL: CPT

## 2018-06-26 NOTE — H&P PST ADULT - PSH
bunionectomy  right 2010, left 2011  H/O ventral hernia repair  2001  hysterectomy  1993  Liver disease  liver resection secondary to nonhodgkin lymphoma 1999  S/P breast lumpectomy  right 1995 bunionectomy  right 2010, left 2011  H/O ventral hernia repair  2001  History of parathyroidectomy  partial 2013  hysterectomy  1993  Liver disease  liver resection secondary to nonhodgkin lymphoma 1999  S/P breast lumpectomy  right 1995

## 2018-06-26 NOTE — H&P PST ADULT - PMH
Chronic renal insufficiency    Gout    hyperparathyroidism    Hypertension    Lymphoma  nonhodgkin chemotherapy 2000  Seasonal allergies Cerebral aneurysm, nonruptured    Chronic renal insufficiency    Gout    hyperparathyroidism    Hypertension    Lymphoma  nonhodgkin liver , chemotherapy 2000 and SX 1999  Osteoarthritis of spine with radiculopathy, lumbar region    Seasonal allergies    Stroke  5/15/18 , no residual

## 2018-06-26 NOTE — H&P PST ADULT - HISTORY OF PRESENT ILLNESS
67 Y/O Female H/O HTN, HLD, Liver Ca S/p resection & Chemo 1999, lymphoma affecting liver 1999. Pt went to ER [facility] in [place] C/O severe leg cramps in right leg, warm feeling, nausea, blurred vision and aphasia. Pt was advised to stay 24 hrs and left AMA, and went to [facility]. MRA and CT head of head revealed 4x4x4 mm aneurysm of the Acomm artery aneurysm. Pt followed up with Neuro surgeon Dr Allison and was then referred to Dr Steven. Presents for Cerebral angio 6/28/18.  Pt denies any headache, N/V, blurred vison or aphasia today.

## 2018-06-26 NOTE — H&P PST ADULT - NSANTHOSAYNRD_GEN_A_CORE
No. CL screening performed.  STOP BANG Legend: 0-2 = LOW Risk; 3-4 = INTERMEDIATE Risk; 5-8 = HIGH Risk

## 2018-06-28 ENCOUNTER — APPOINTMENT (OUTPATIENT)
Dept: NEUROSURGERY | Facility: HOSPITAL | Age: 68
End: 2018-06-28

## 2018-07-17 PROBLEM — I63.9 CEREBRAL INFARCTION, UNSPECIFIED: Chronic | Status: ACTIVE | Noted: 2018-06-26

## 2018-08-06 PROBLEM — I67.1 CEREBRAL ANEURYSM, NONRUPTURED: Chronic | Status: ACTIVE | Noted: 2018-06-26

## 2018-08-06 PROBLEM — N18.9 CHRONIC KIDNEY DISEASE, UNSPECIFIED: Chronic | Status: ACTIVE | Noted: 2018-05-15

## 2018-08-06 PROBLEM — M47.26 OTHER SPONDYLOSIS WITH RADICULOPATHY, LUMBAR REGION: Chronic | Status: ACTIVE | Noted: 2018-06-26

## 2018-08-14 ENCOUNTER — APPOINTMENT (OUTPATIENT)
Dept: MAMMOGRAPHY | Facility: IMAGING CENTER | Age: 68
End: 2018-08-14

## 2018-09-13 ENCOUNTER — OUTPATIENT (OUTPATIENT)
Dept: OUTPATIENT SERVICES | Facility: HOSPITAL | Age: 68
LOS: 1 days | End: 2018-09-13
Payer: MEDICARE

## 2018-09-13 ENCOUNTER — APPOINTMENT (OUTPATIENT)
Dept: RADIOLOGY | Facility: IMAGING CENTER | Age: 68
End: 2018-09-13

## 2018-09-13 ENCOUNTER — APPOINTMENT (OUTPATIENT)
Dept: CT IMAGING | Facility: IMAGING CENTER | Age: 68
End: 2018-09-13
Payer: MEDICARE

## 2018-09-13 ENCOUNTER — APPOINTMENT (OUTPATIENT)
Dept: MAMMOGRAPHY | Facility: IMAGING CENTER | Age: 68
End: 2018-09-13

## 2018-09-13 DIAGNOSIS — Z00.8 ENCOUNTER FOR OTHER GENERAL EXAMINATION: ICD-10-CM

## 2018-09-13 DIAGNOSIS — Z98.890 OTHER SPECIFIED POSTPROCEDURAL STATES: Chronic | ICD-10-CM

## 2018-09-13 PROCEDURE — 71046 X-RAY EXAM CHEST 2 VIEWS: CPT | Mod: 26

## 2018-09-13 PROCEDURE — 74176 CT ABD & PELVIS W/O CONTRAST: CPT | Mod: 26

## 2018-09-13 PROCEDURE — 77067 SCR MAMMO BI INCL CAD: CPT | Mod: 26

## 2018-09-13 PROCEDURE — 74176 CT ABD & PELVIS W/O CONTRAST: CPT

## 2018-09-13 PROCEDURE — 77063 BREAST TOMOSYNTHESIS BI: CPT | Mod: 26

## 2018-09-13 PROCEDURE — 77067 SCR MAMMO BI INCL CAD: CPT

## 2018-09-13 PROCEDURE — 71046 X-RAY EXAM CHEST 2 VIEWS: CPT

## 2018-09-13 PROCEDURE — 77063 BREAST TOMOSYNTHESIS BI: CPT

## 2019-04-30 NOTE — ED ADULT NURSE NOTE - NS ED NOTE ABUSE RESPONSE YN
Patient Care Team:  Ramone Hernandez MD as PCP - General  Ramone Hernandez Earl VAZQUEZ  MD Yvette;  Location: Mountain View Adriel De Luna MD [facility]    04/30/19  11:09 AM  Patient Care Team:  Ramone Hernandez MD as PCP - General  Ramone Hernandez MD as PCP - Family Medicine     Yes

## 2019-08-01 ENCOUNTER — APPOINTMENT (OUTPATIENT)
Dept: OBGYN | Facility: CLINIC | Age: 69
End: 2019-08-01
Payer: MEDICARE

## 2019-08-01 VITALS
WEIGHT: 145 LBS | SYSTOLIC BLOOD PRESSURE: 150 MMHG | DIASTOLIC BLOOD PRESSURE: 82 MMHG | HEART RATE: 76 BPM | BODY MASS INDEX: 22.76 KG/M2 | HEIGHT: 67 IN

## 2019-08-01 DIAGNOSIS — N95.2 POSTMENOPAUSAL ATROPHIC VAGINITIS: ICD-10-CM

## 2019-08-01 DIAGNOSIS — Z01.419 ENCOUNTER FOR GYNECOLOGICAL EXAMINATION (GENERAL) (ROUTINE) W/OUT ABNORMAL FINDINGS: ICD-10-CM

## 2019-08-01 PROCEDURE — 99387 INIT PM E/M NEW PAT 65+ YRS: CPT

## 2019-08-01 NOTE — HISTORY OF PRESENT ILLNESS
[Last Mammogram ___] : Last Mammogram was [unfilled] [Last Pap ___] : Last cervical pap smear was [unfilled] [Postmenopausal] : is postmenopausal [Last Colonoscopy ___] : Last colonoscopy [unfilled]

## 2019-08-06 LAB — CYTOLOGY CVX/VAG DOC THIN PREP: ABNORMAL

## 2019-10-17 NOTE — ED PROVIDER NOTE - CRANIAL NERVE AND PUPILLARY EXAM
A new diagnosis abnormal EMG of the in the left upper extremity patient using the splint proper use discussed with the patient if there is no improvement to call the office tongue is midline/cranial nerves 2-12 intact/extra-ocular movements intact

## 2020-01-24 ENCOUNTER — APPOINTMENT (OUTPATIENT)
Dept: OBGYN | Facility: CLINIC | Age: 70
End: 2020-01-24
Payer: MEDICARE

## 2020-01-24 VITALS
DIASTOLIC BLOOD PRESSURE: 90 MMHG | SYSTOLIC BLOOD PRESSURE: 146 MMHG | HEIGHT: 67 IN | BODY MASS INDEX: 22.76 KG/M2 | WEIGHT: 145 LBS

## 2020-01-24 DIAGNOSIS — R10.2 PELVIC AND PERINEAL PAIN: ICD-10-CM

## 2020-01-24 PROCEDURE — 99213 OFFICE O/P EST LOW 20 MIN: CPT

## 2020-01-24 PROCEDURE — 81002 URINALYSIS NONAUTO W/O SCOPE: CPT

## 2020-01-24 NOTE — CHIEF COMPLAINT
[Follow Up] : follow up GYN visit [FreeTextEntry1] : Patient complaining of lower pelvic pain\par Possible urinary tract infection

## 2020-01-24 NOTE — PHYSICAL EXAM
[Normal] : cervix [No Bleeding] : there was no active vaginal bleeding [Uterine Adnexae] : were not tender and not enlarged [de-identified] : abdomen soft and non tender

## 2020-01-27 LAB
BACTERIA UR CULT: NORMAL
BILIRUB UR QL STRIP: NORMAL
CLARITY UR: CLEAR
COLLECTION METHOD: NORMAL
GLUCOSE UR-MCNC: NORMAL
HCG UR QL: 0.2 EU/DL
HGB UR QL STRIP.AUTO: NORMAL
KETONES UR-MCNC: NORMAL
LEUKOCYTE ESTERASE UR QL STRIP: NORMAL
NITRITE UR QL STRIP: NORMAL
PH UR STRIP: 5.5
PROT UR STRIP-MCNC: NORMAL
SP GR UR STRIP: 1.01

## 2020-03-05 NOTE — ED PROVIDER NOTE - CROS ED ROS STATEMENT
all other ROS negative except as per HPI Pt presents to ED awake, alert and ambulatory, c/o right lower back pain. 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 A large, heavy box was falling to the left and pt reached to catch it, causing him to feel a strain in his right lower back. He states the area was swollen and very painful for the first 3 days. He went to a physical therapist who "worked out the knot" and pt followed up with his PCP who recommended he come to the ED. Pt reports increasing pain that radiates to the left lower back and into BL hips. Pt reports intermittent tingling in BL toes. Taking Motrin at home for pain. A&Ox4.

## 2020-03-17 NOTE — H&P PST ADULT - CARDIOVASCULAR DETAILS
positive S1/positive S2 Detail Level: Detailed Quality 111:Pneumonia Vaccination Status For Older Adults: Pneumococcal Vaccination Previously Received Quality 431: Preventive Care And Screening: Unhealthy Alcohol Use - Screening: Patient screened for unhealthy alcohol use using a single question and scores less than 2 times per year Quality 110: Preventive Care And Screening: Influenza Immunization: Influenza Immunization Administered during Influenza season Quality 226: Preventive Care And Screening: Tobacco Use: Screening And Cessation Intervention: Patient screened for tobacco use and is an ex/non-smoker

## 2020-08-19 ENCOUNTER — OUTPATIENT (OUTPATIENT)
Dept: OUTPATIENT SERVICES | Facility: HOSPITAL | Age: 70
LOS: 1 days | End: 2020-08-19
Payer: MEDICARE

## 2020-08-19 ENCOUNTER — APPOINTMENT (OUTPATIENT)
Dept: MRI IMAGING | Facility: IMAGING CENTER | Age: 70
End: 2020-08-19
Payer: MEDICARE

## 2020-08-19 DIAGNOSIS — Z98.890 OTHER SPECIFIED POSTPROCEDURAL STATES: Chronic | ICD-10-CM

## 2020-08-19 DIAGNOSIS — Z00.8 ENCOUNTER FOR OTHER GENERAL EXAMINATION: ICD-10-CM

## 2020-08-19 PROCEDURE — 70544 MR ANGIOGRAPHY HEAD W/O DYE: CPT

## 2020-08-19 PROCEDURE — 70544 MR ANGIOGRAPHY HEAD W/O DYE: CPT | Mod: 26

## 2020-08-21 ENCOUNTER — APPOINTMENT (OUTPATIENT)
Dept: MRI IMAGING | Facility: IMAGING CENTER | Age: 70
End: 2020-08-21

## 2020-08-26 ENCOUNTER — APPOINTMENT (OUTPATIENT)
Dept: ULTRASOUND IMAGING | Facility: IMAGING CENTER | Age: 70
End: 2020-08-26
Payer: MEDICARE

## 2020-08-26 ENCOUNTER — OUTPATIENT (OUTPATIENT)
Dept: OUTPATIENT SERVICES | Facility: HOSPITAL | Age: 70
LOS: 1 days | End: 2020-08-26
Payer: MEDICARE

## 2020-08-26 DIAGNOSIS — Z98.890 OTHER SPECIFIED POSTPROCEDURAL STATES: Chronic | ICD-10-CM

## 2020-08-26 DIAGNOSIS — Z00.8 ENCOUNTER FOR OTHER GENERAL EXAMINATION: ICD-10-CM

## 2020-08-26 PROCEDURE — 76775 US EXAM ABDO BACK WALL LIM: CPT

## 2020-08-26 PROCEDURE — 76775 US EXAM ABDO BACK WALL LIM: CPT | Mod: 26

## 2021-06-02 ENCOUNTER — OUTPATIENT (OUTPATIENT)
Dept: OUTPATIENT SERVICES | Facility: HOSPITAL | Age: 71
LOS: 1 days | End: 2021-06-02
Payer: MEDICARE

## 2021-06-02 ENCOUNTER — APPOINTMENT (OUTPATIENT)
Dept: MAMMOGRAPHY | Facility: IMAGING CENTER | Age: 71
End: 2021-06-02
Payer: MEDICARE

## 2021-06-02 ENCOUNTER — APPOINTMENT (OUTPATIENT)
Dept: ULTRASOUND IMAGING | Facility: IMAGING CENTER | Age: 71
End: 2021-06-02
Payer: MEDICARE

## 2021-06-02 ENCOUNTER — APPOINTMENT (OUTPATIENT)
Dept: RADIOLOGY | Facility: IMAGING CENTER | Age: 71
End: 2021-06-02
Payer: MEDICARE

## 2021-06-02 DIAGNOSIS — Z98.890 OTHER SPECIFIED POSTPROCEDURAL STATES: Chronic | ICD-10-CM

## 2021-06-02 DIAGNOSIS — Z00.8 ENCOUNTER FOR OTHER GENERAL EXAMINATION: ICD-10-CM

## 2021-06-02 PROCEDURE — 77080 DXA BONE DENSITY AXIAL: CPT | Mod: 26

## 2021-06-02 PROCEDURE — 77063 BREAST TOMOSYNTHESIS BI: CPT | Mod: 26

## 2021-06-02 PROCEDURE — 76641 ULTRASOUND BREAST COMPLETE: CPT | Mod: 26,50

## 2021-06-02 PROCEDURE — 77067 SCR MAMMO BI INCL CAD: CPT | Mod: 26

## 2021-06-02 PROCEDURE — 77067 SCR MAMMO BI INCL CAD: CPT

## 2021-06-02 PROCEDURE — 77080 DXA BONE DENSITY AXIAL: CPT

## 2021-06-02 PROCEDURE — 77063 BREAST TOMOSYNTHESIS BI: CPT

## 2021-06-02 PROCEDURE — 76641 ULTRASOUND BREAST COMPLETE: CPT

## 2021-06-10 ENCOUNTER — OUTPATIENT (OUTPATIENT)
Dept: OUTPATIENT SERVICES | Facility: HOSPITAL | Age: 71
LOS: 1 days | End: 2021-06-10
Payer: MEDICARE

## 2021-06-10 ENCOUNTER — APPOINTMENT (OUTPATIENT)
Dept: MAMMOGRAPHY | Facility: IMAGING CENTER | Age: 71
End: 2021-06-10
Payer: MEDICARE

## 2021-06-10 ENCOUNTER — APPOINTMENT (OUTPATIENT)
Dept: ULTRASOUND IMAGING | Facility: IMAGING CENTER | Age: 71
End: 2021-06-10
Payer: MEDICARE

## 2021-06-10 DIAGNOSIS — Z00.8 ENCOUNTER FOR OTHER GENERAL EXAMINATION: ICD-10-CM

## 2021-06-10 DIAGNOSIS — Z98.890 OTHER SPECIFIED POSTPROCEDURAL STATES: Chronic | ICD-10-CM

## 2021-06-10 PROCEDURE — 77066 DX MAMMO INCL CAD BI: CPT | Mod: 26

## 2021-06-10 PROCEDURE — G0279: CPT

## 2021-06-10 PROCEDURE — 77066 DX MAMMO INCL CAD BI: CPT

## 2021-06-10 PROCEDURE — 76641 ULTRASOUND BREAST COMPLETE: CPT | Mod: 26,50

## 2021-06-10 PROCEDURE — G0279: CPT | Mod: 26

## 2021-06-10 PROCEDURE — 76641 ULTRASOUND BREAST COMPLETE: CPT

## 2021-06-18 ENCOUNTER — RESULT REVIEW (OUTPATIENT)
Age: 71
End: 2021-06-18

## 2021-06-18 ENCOUNTER — APPOINTMENT (OUTPATIENT)
Dept: ULTRASOUND IMAGING | Facility: IMAGING CENTER | Age: 71
End: 2021-06-18
Payer: MEDICARE

## 2021-06-18 ENCOUNTER — OUTPATIENT (OUTPATIENT)
Dept: OUTPATIENT SERVICES | Facility: HOSPITAL | Age: 71
LOS: 1 days | End: 2021-06-18
Payer: MEDICARE

## 2021-06-18 DIAGNOSIS — Z98.890 OTHER SPECIFIED POSTPROCEDURAL STATES: Chronic | ICD-10-CM

## 2021-06-18 DIAGNOSIS — R92.8 OTHER ABNORMAL AND INCONCLUSIVE FINDINGS ON DIAGNOSTIC IMAGING OF BREAST: ICD-10-CM

## 2021-06-18 PROCEDURE — 88305 TISSUE EXAM BY PATHOLOGIST: CPT | Mod: 26

## 2021-06-18 PROCEDURE — A4648: CPT

## 2021-06-18 PROCEDURE — 77065 DX MAMMO INCL CAD UNI: CPT

## 2021-06-18 PROCEDURE — 19084 BX BREAST ADD LESION US IMAG: CPT | Mod: RT

## 2021-06-18 PROCEDURE — 19083 BX BREAST 1ST LESION US IMAG: CPT | Mod: LT

## 2021-06-18 PROCEDURE — 77065 DX MAMMO INCL CAD UNI: CPT | Mod: 26,LT

## 2021-06-18 PROCEDURE — 77065 DX MAMMO INCL CAD UNI: CPT | Mod: 26,RT

## 2021-06-18 PROCEDURE — 19084 BX BREAST ADD LESION US IMAG: CPT

## 2021-06-18 PROCEDURE — 88305 TISSUE EXAM BY PATHOLOGIST: CPT

## 2021-06-18 PROCEDURE — 19083 BX BREAST 1ST LESION US IMAG: CPT

## 2021-08-02 NOTE — ED CDU PROVIDER INITIAL DAY NOTE - NS_OBSORDERDATE_ED_A_ED
14-May-2018 22:08 Griseofulvin Pregnancy And Lactation Text: This medication is Pregnancy Category X and is known to cause serious birth defects. It is unknown if this medication is excreted in breast milk but breast feeding should be avoided.

## 2021-08-13 ENCOUNTER — OUTPATIENT (OUTPATIENT)
Dept: OUTPATIENT SERVICES | Facility: HOSPITAL | Age: 71
LOS: 1 days | End: 2021-08-13
Payer: MEDICARE

## 2021-08-13 ENCOUNTER — APPOINTMENT (OUTPATIENT)
Dept: MRI IMAGING | Facility: CLINIC | Age: 71
End: 2021-08-13
Payer: MEDICARE

## 2021-08-13 DIAGNOSIS — Z00.8 ENCOUNTER FOR OTHER GENERAL EXAMINATION: ICD-10-CM

## 2021-08-13 DIAGNOSIS — D24.2 BENIGN NEOPLASM OF LEFT BREAST: ICD-10-CM

## 2021-08-13 DIAGNOSIS — Z98.890 OTHER SPECIFIED POSTPROCEDURAL STATES: Chronic | ICD-10-CM

## 2021-08-13 PROCEDURE — C8937: CPT

## 2021-08-13 PROCEDURE — C8908: CPT

## 2021-08-13 PROCEDURE — 77049 MRI BREAST C-+ W/CAD BI: CPT | Mod: 26

## 2021-08-13 PROCEDURE — A9585: CPT

## 2021-08-17 ENCOUNTER — APPOINTMENT (OUTPATIENT)
Dept: MRI IMAGING | Facility: IMAGING CENTER | Age: 71
End: 2021-08-17

## 2021-08-17 ENCOUNTER — OUTPATIENT (OUTPATIENT)
Dept: OUTPATIENT SERVICES | Facility: HOSPITAL | Age: 71
LOS: 1 days | End: 2021-08-17
Payer: MEDICARE

## 2021-08-17 DIAGNOSIS — M47.27 OTHER SPONDYLOSIS WITH RADICULOPATHY, LUMBOSACRAL REGION: ICD-10-CM

## 2021-08-17 DIAGNOSIS — Z98.890 OTHER SPECIFIED POSTPROCEDURAL STATES: Chronic | ICD-10-CM

## 2021-08-17 PROCEDURE — 72148 MRI LUMBAR SPINE W/O DYE: CPT

## 2021-10-14 ENCOUNTER — OUTPATIENT (OUTPATIENT)
Dept: OUTPATIENT SERVICES | Facility: HOSPITAL | Age: 71
LOS: 1 days | End: 2021-10-14
Payer: MEDICARE

## 2021-10-14 ENCOUNTER — APPOINTMENT (OUTPATIENT)
Dept: MRI IMAGING | Facility: IMAGING CENTER | Age: 71
End: 2021-10-14
Payer: MEDICARE

## 2021-10-14 DIAGNOSIS — Z98.890 OTHER SPECIFIED POSTPROCEDURAL STATES: Chronic | ICD-10-CM

## 2021-10-14 DIAGNOSIS — Z00.8 ENCOUNTER FOR OTHER GENERAL EXAMINATION: ICD-10-CM

## 2021-10-14 PROCEDURE — 19085 BX BREAST 1ST LESION MR IMAG: CPT

## 2021-10-14 PROCEDURE — A9585: CPT

## 2021-10-14 PROCEDURE — 19085 BX BREAST 1ST LESION MR IMAG: CPT | Mod: 53,LT

## 2021-11-03 ENCOUNTER — OUTPATIENT (OUTPATIENT)
Dept: OUTPATIENT SERVICES | Facility: HOSPITAL | Age: 71
LOS: 1 days | End: 2021-11-03
Payer: MEDICARE

## 2021-11-03 ENCOUNTER — RESULT REVIEW (OUTPATIENT)
Age: 71
End: 2021-11-03

## 2021-11-03 ENCOUNTER — APPOINTMENT (OUTPATIENT)
Dept: MRI IMAGING | Facility: IMAGING CENTER | Age: 71
End: 2021-11-03
Payer: MEDICARE

## 2021-11-03 DIAGNOSIS — D24.2 BENIGN NEOPLASM OF LEFT BREAST: ICD-10-CM

## 2021-11-03 DIAGNOSIS — Z98.890 OTHER SPECIFIED POSTPROCEDURAL STATES: Chronic | ICD-10-CM

## 2021-11-03 DIAGNOSIS — N63.24 UNSPECIFIED LUMP IN THE LEFT BREAST, LOWER INNER QUADRANT: ICD-10-CM

## 2021-11-03 PROCEDURE — 19085 BX BREAST 1ST LESION MR IMAG: CPT | Mod: LT

## 2021-11-03 PROCEDURE — 77065 DX MAMMO INCL CAD UNI: CPT

## 2021-11-03 PROCEDURE — 88360 TUMOR IMMUNOHISTOCHEM/MANUAL: CPT

## 2021-11-03 PROCEDURE — 19085 BX BREAST 1ST LESION MR IMAG: CPT

## 2021-11-03 PROCEDURE — 19086 BX BREAST ADD LESION MR IMAG: CPT

## 2021-11-03 PROCEDURE — A9585: CPT

## 2021-11-03 PROCEDURE — 77065 DX MAMMO INCL CAD UNI: CPT | Mod: 26,LT

## 2021-11-03 PROCEDURE — 19086 BX BREAST ADD LESION MR IMAG: CPT | Mod: LT

## 2021-11-03 PROCEDURE — 88360 TUMOR IMMUNOHISTOCHEM/MANUAL: CPT | Mod: 26

## 2021-11-09 LAB — SURGICAL PATHOLOGY STUDY: SIGNIFICANT CHANGE UP

## 2023-09-02 ENCOUNTER — INPATIENT (INPATIENT)
Facility: HOSPITAL | Age: 73
LOS: 3 days | Discharge: HOME HEALTH | DRG: 644 | End: 2023-09-06
Attending: INTERNAL MEDICINE | Admitting: INTERNAL MEDICINE
Payer: MEDICARE

## 2023-09-02 VITALS
RESPIRATION RATE: 17 BRPM | WEIGHT: 110.01 LBS | HEIGHT: 62 IN | DIASTOLIC BLOOD PRESSURE: 65 MMHG | SYSTOLIC BLOOD PRESSURE: 132 MMHG | TEMPERATURE: 99 F | OXYGEN SATURATION: 98 % | HEART RATE: 68 BPM

## 2023-09-02 DIAGNOSIS — Z98.890 OTHER SPECIFIED POSTPROCEDURAL STATES: Chronic | ICD-10-CM

## 2023-09-02 DIAGNOSIS — R62.7 ADULT FAILURE TO THRIVE: ICD-10-CM

## 2023-09-02 LAB
ALBUMIN SERPL ELPH-MCNC: 4.2 G/DL — SIGNIFICANT CHANGE UP (ref 3.3–5)
ALP SERPL-CCNC: 74 U/L — SIGNIFICANT CHANGE UP (ref 40–120)
ALT FLD-CCNC: 21 U/L — SIGNIFICANT CHANGE UP (ref 10–45)
ANION GAP SERPL CALC-SCNC: 15 MMOL/L — SIGNIFICANT CHANGE UP (ref 5–17)
ANISOCYTOSIS BLD QL: SLIGHT — SIGNIFICANT CHANGE UP
APPEARANCE UR: CLEAR — SIGNIFICANT CHANGE UP
APTT BLD: 25.6 SEC — SIGNIFICANT CHANGE UP (ref 24.5–35.6)
AST SERPL-CCNC: 20 U/L — SIGNIFICANT CHANGE UP (ref 10–40)
BACTERIA # UR AUTO: NEGATIVE — SIGNIFICANT CHANGE UP
BASE EXCESS BLDV CALC-SCNC: -5.6 MMOL/L — LOW (ref -2–3)
BASE EXCESS BLDV CALC-SCNC: -6.3 MMOL/L — LOW (ref -2–3)
BASOPHILS # BLD AUTO: 0 K/UL — SIGNIFICANT CHANGE UP (ref 0–0.2)
BASOPHILS NFR BLD AUTO: 0 % — SIGNIFICANT CHANGE UP (ref 0–2)
BILIRUB SERPL-MCNC: 0.2 MG/DL — SIGNIFICANT CHANGE UP (ref 0.2–1.2)
BILIRUB UR-MCNC: NEGATIVE — SIGNIFICANT CHANGE UP
BLD GP AB SCN SERPL QL: NEGATIVE — SIGNIFICANT CHANGE UP
BUN SERPL-MCNC: 62 MG/DL — HIGH (ref 7–23)
BURR CELLS BLD QL SMEAR: PRESENT — SIGNIFICANT CHANGE UP
CA-I SERPL-SCNC: 1.54 MMOL/L — HIGH (ref 1.15–1.33)
CA-I SERPL-SCNC: 1.6 MMOL/L — CRITICAL HIGH (ref 1.15–1.33)
CALCIUM SERPL-MCNC: 11.9 MG/DL — HIGH (ref 8.4–10.5)
CHLORIDE BLDV-SCNC: 110 MMOL/L — HIGH (ref 96–108)
CHLORIDE BLDV-SCNC: 112 MMOL/L — HIGH (ref 96–108)
CHLORIDE SERPL-SCNC: 108 MMOL/L — SIGNIFICANT CHANGE UP (ref 96–108)
CO2 BLDV-SCNC: 20 MMOL/L — LOW (ref 22–26)
CO2 BLDV-SCNC: 22 MMOL/L — SIGNIFICANT CHANGE UP (ref 22–26)
CO2 SERPL-SCNC: 20 MMOL/L — LOW (ref 22–31)
COLOR SPEC: SIGNIFICANT CHANGE UP
CREAT SERPL-MCNC: 4.74 MG/DL — HIGH (ref 0.5–1.3)
DACRYOCYTES BLD QL SMEAR: SLIGHT — SIGNIFICANT CHANGE UP
DIFF PNL FLD: NEGATIVE — SIGNIFICANT CHANGE UP
EGFR: 9 ML/MIN/1.73M2 — LOW
ELLIPTOCYTES BLD QL SMEAR: SLIGHT — SIGNIFICANT CHANGE UP
EOSINOPHIL # BLD AUTO: 0.06 K/UL — SIGNIFICANT CHANGE UP (ref 0–0.5)
EOSINOPHIL NFR BLD AUTO: 1.8 % — SIGNIFICANT CHANGE UP (ref 0–6)
EPI CELLS # UR: 0 /HPF — SIGNIFICANT CHANGE UP
GAS PNL BLDV: 139 MMOL/L — SIGNIFICANT CHANGE UP (ref 136–145)
GAS PNL BLDV: 139 MMOL/L — SIGNIFICANT CHANGE UP (ref 136–145)
GAS PNL BLDV: SIGNIFICANT CHANGE UP
GLUCOSE BLDV-MCNC: 114 MG/DL — HIGH (ref 70–99)
GLUCOSE BLDV-MCNC: 83 MG/DL — SIGNIFICANT CHANGE UP (ref 70–99)
GLUCOSE SERPL-MCNC: 120 MG/DL — HIGH (ref 70–99)
GLUCOSE UR QL: NEGATIVE — SIGNIFICANT CHANGE UP
HCO3 BLDV-SCNC: 19 MMOL/L — LOW (ref 22–29)
HCO3 BLDV-SCNC: 21 MMOL/L — LOW (ref 22–29)
HCT VFR BLD CALC: 28.4 % — LOW (ref 34.5–45)
HCT VFR BLDA CALC: 28 % — LOW (ref 34.5–46.5)
HCT VFR BLDA CALC: 30 % — LOW (ref 34.5–46.5)
HGB BLD CALC-MCNC: 10.1 G/DL — LOW (ref 11.7–16.1)
HGB BLD CALC-MCNC: 9.3 G/DL — LOW (ref 11.7–16.1)
HGB BLD-MCNC: 9.7 G/DL — LOW (ref 11.5–15.5)
HYALINE CASTS # UR AUTO: 1 /LPF — SIGNIFICANT CHANGE UP (ref 0–2)
INR BLD: 1.02 RATIO — SIGNIFICANT CHANGE UP (ref 0.85–1.18)
KETONES UR-MCNC: NEGATIVE — SIGNIFICANT CHANGE UP
LACTATE BLDV-MCNC: 0.5 MMOL/L — SIGNIFICANT CHANGE UP (ref 0.5–2)
LACTATE BLDV-MCNC: 0.9 MMOL/L — SIGNIFICANT CHANGE UP (ref 0.5–2)
LEUKOCYTE ESTERASE UR-ACNC: NEGATIVE — SIGNIFICANT CHANGE UP
LYMPHOCYTES # BLD AUTO: 0.67 K/UL — LOW (ref 1–3.3)
LYMPHOCYTES # BLD AUTO: 21 % — SIGNIFICANT CHANGE UP (ref 13–44)
MACROCYTES BLD QL: SLIGHT — SIGNIFICANT CHANGE UP
MANUAL SMEAR VERIFICATION: SIGNIFICANT CHANGE UP
MCHC RBC-ENTMCNC: 29.1 PG — SIGNIFICANT CHANGE UP (ref 27–34)
MCHC RBC-ENTMCNC: 34.2 GM/DL — SIGNIFICANT CHANGE UP (ref 32–36)
MCV RBC AUTO: 85.3 FL — SIGNIFICANT CHANGE UP (ref 80–100)
MICROCYTES BLD QL: SLIGHT — SIGNIFICANT CHANGE UP
MONOCYTES # BLD AUTO: 0.36 K/UL — SIGNIFICANT CHANGE UP (ref 0–0.9)
MONOCYTES NFR BLD AUTO: 11.4 % — SIGNIFICANT CHANGE UP (ref 2–14)
NEUTROPHILS # BLD AUTO: 2.08 K/UL — SIGNIFICANT CHANGE UP (ref 1.8–7.4)
NEUTROPHILS NFR BLD AUTO: 64.9 % — SIGNIFICANT CHANGE UP (ref 43–77)
NITRITE UR-MCNC: NEGATIVE — SIGNIFICANT CHANGE UP
OVALOCYTES BLD QL SMEAR: SLIGHT — SIGNIFICANT CHANGE UP
PCO2 BLDV: 36 MMHG — LOW (ref 39–42)
PCO2 BLDV: 46 MMHG — HIGH (ref 39–42)
PH BLDV: 7.27 — LOW (ref 7.32–7.43)
PH BLDV: 7.33 — SIGNIFICANT CHANGE UP (ref 7.32–7.43)
PH UR: 6 — SIGNIFICANT CHANGE UP (ref 5–8)
PLAT MORPH BLD: NORMAL — SIGNIFICANT CHANGE UP
PLATELET # BLD AUTO: 189 K/UL — SIGNIFICANT CHANGE UP (ref 150–400)
PO2 BLDV: 26 MMHG — SIGNIFICANT CHANGE UP (ref 25–45)
PO2 BLDV: 52 MMHG — HIGH (ref 25–45)
POLYCHROMASIA BLD QL SMEAR: SLIGHT — SIGNIFICANT CHANGE UP
POTASSIUM BLDV-SCNC: 4.4 MMOL/L — SIGNIFICANT CHANGE UP (ref 3.5–5.1)
POTASSIUM BLDV-SCNC: 4.6 MMOL/L — SIGNIFICANT CHANGE UP (ref 3.5–5.1)
POTASSIUM SERPL-MCNC: 4.5 MMOL/L — SIGNIFICANT CHANGE UP (ref 3.5–5.3)
POTASSIUM SERPL-SCNC: 4.5 MMOL/L — SIGNIFICANT CHANGE UP (ref 3.5–5.3)
PROT SERPL-MCNC: 6.9 G/DL — SIGNIFICANT CHANGE UP (ref 6–8.3)
PROT UR-MCNC: ABNORMAL
PROTHROM AB SERPL-ACNC: 10.7 SEC — SIGNIFICANT CHANGE UP (ref 9.5–13)
RBC # BLD: 3.33 M/UL — LOW (ref 3.8–5.2)
RBC # FLD: 14.6 % — HIGH (ref 10.3–14.5)
RBC BLD AUTO: ABNORMAL
RBC CASTS # UR COMP ASSIST: 2 /HPF — SIGNIFICANT CHANGE UP (ref 0–4)
RH IG SCN BLD-IMP: POSITIVE — SIGNIFICANT CHANGE UP
SAO2 % BLDV: 33.3 % — LOW (ref 67–88)
SAO2 % BLDV: 85.2 % — SIGNIFICANT CHANGE UP (ref 67–88)
SCHISTOCYTES BLD QL AUTO: SLIGHT — SIGNIFICANT CHANGE UP
SODIUM SERPL-SCNC: 143 MMOL/L — SIGNIFICANT CHANGE UP (ref 135–145)
SP GR SPEC: 1.01 — SIGNIFICANT CHANGE UP (ref 1.01–1.02)
T3 SERPL-MCNC: 52 NG/DL — LOW (ref 80–200)
T4 AB SER-ACNC: 5.9 UG/DL — SIGNIFICANT CHANGE UP (ref 4.6–12)
T4 FREE SERPL-MCNC: 1 NG/DL — SIGNIFICANT CHANGE UP (ref 0.9–1.8)
TROPONIN T, HIGH SENSITIVITY RESULT: 62 NG/L — HIGH (ref 0–51)
TROPONIN T, HIGH SENSITIVITY RESULT: 66 NG/L — HIGH (ref 0–51)
TSH SERPL-MCNC: 2.31 UIU/ML — SIGNIFICANT CHANGE UP (ref 0.27–4.2)
UROBILINOGEN FLD QL: NEGATIVE — SIGNIFICANT CHANGE UP
VARIANT LYMPHS # BLD: 0.9 % — SIGNIFICANT CHANGE UP (ref 0–6)
WBC # BLD: 3.2 K/UL — LOW (ref 3.8–10.5)
WBC # FLD AUTO: 3.2 K/UL — LOW (ref 3.8–10.5)
WBC UR QL: 0 /HPF — SIGNIFICANT CHANGE UP (ref 0–5)

## 2023-09-02 PROCEDURE — 99223 1ST HOSP IP/OBS HIGH 75: CPT

## 2023-09-02 PROCEDURE — 76937 US GUIDE VASCULAR ACCESS: CPT | Mod: 26

## 2023-09-02 PROCEDURE — 74176 CT ABD & PELVIS W/O CONTRAST: CPT | Mod: 26,MA

## 2023-09-02 PROCEDURE — 99285 EMERGENCY DEPT VISIT HI MDM: CPT

## 2023-09-02 PROCEDURE — 71045 X-RAY EXAM CHEST 1 VIEW: CPT | Mod: 26

## 2023-09-02 RX ORDER — SODIUM CHLORIDE 9 MG/ML
1000 INJECTION INTRAMUSCULAR; INTRAVENOUS; SUBCUTANEOUS ONCE
Refills: 0 | Status: COMPLETED | OUTPATIENT
Start: 2023-09-02 | End: 2023-09-02

## 2023-09-02 RX ADMIN — SODIUM CHLORIDE 1000 MILLILITER(S): 9 INJECTION INTRAMUSCULAR; INTRAVENOUS; SUBCUTANEOUS at 17:57

## 2023-09-02 NOTE — ED PROVIDER NOTE - PHYSICAL EXAMINATION
CONSTITUTIONAL: Well-developed; in no acute distress.   SKIN: warm, dry, no rash  HEAD: Normocephalic; atraumatic.  EYES: no conjunctival injection. PERRL. no scleral icterus  ENT: No nasal discharge; airway clear.  NECK: Supple; non tender.  CARD: S1, S2 normal; no murmurs, gallops, or rubs. Regular rate and rhythm.   RESP: No wheezes, rales or rhonchi. Good air movement bilaterally.   ABD: soft ntnd, no guarding, no distention, no rigidity.   EXT: No cyanosis or edema.   NEURO: alert, oriented to ppte, cn 2-12 intact, motor strength intact throughout, sensation intact throughout  PSYCH: Cooperative, appropriate. CONSTITUTIONAL: Well-developed; in no acute distress.   SKIN: warm, dry, no rash  HEAD: Normocephalic; atraumatic.  EYES: no conjunctival injection. PERRL. no scleral icterus  ENT: No nasal discharge; airway clear.  NECK: Supple; non tender.  CARD: S1, S2 normal; no murmurs, gallops, or rubs. Regular rate and rhythm.   RESP: No wheezes, rales or rhonchi. Good air movement bilaterally.   ABD: soft ntnd, no guarding, no distention, no rigidity.   EXT: No cyanosis or edema.   NEURO: alert, oriented to ppte, cn 2-12 intact, motor strength intact throughout, sensation intact throughout  PSYCH: Cooperative, appropriate.  Attending Annalise Gonzalez: Gen: frail appearing, heent: atrauamtic, eomi, perrla,dry mucous membran es, op pink, uvula midline, neck; nttp, no nuchal rigidity, chest: nttp, no crepitus, cv: rrr,s, lungs: ctab, abd: soft, nontender, nondistended, no peritoneal signsno guarding, ext: wwp, neg homans, skin: no rash, neuro: awake and alert, following commands, moving all extremiteis

## 2023-09-02 NOTE — ED ADULT NURSE NOTE - NSFALLRISKINTERV_ED_ALL_ED

## 2023-09-02 NOTE — H&P ADULT - PROBLEM SELECTOR PLAN 2
has h/o hyperparathyroid , been off cinacalcet likely contributing , as well as dehydration   - monitor serum calcium   - gentle hydration w/  IV fluids   - f/u Vt D and pth level   - resume cinacalcet

## 2023-09-02 NOTE — ED ADULT NURSE REASSESSMENT NOTE - NS ED NURSE REASSESS COMMENT FT1
Report received from ANNI Medina RN. Patient presenting to the ED following witnessed fall off chair. Patient with increased weakness. Notes no pain at this time, only from tape around ultrasound guided IV. IV redressed and patient more comfortable. Daughter at bedside, comfort and safety measures maintained.

## 2023-09-02 NOTE — ED PROVIDER NOTE - ATTENDING CONTRIBUTION TO CARE
Attending MD Annalise Gonzalez:  I personally have seen and examined this patient.  Resident note reviewed and agree on plan of care and except where noted.  See HPI, PE, and MDM for details.

## 2023-09-02 NOTE — ED ADULT TRIAGE NOTE - ARRIVAL FROM
Home Posterior Auricular Interpolation Flap Text: A decision was made to reconstruct the defect utilizing an interpolation axial flap and a staged reconstruction.  A telfa template was made of the defect.  This telfa template was then used to outline the posterior auricular interpolation flap.  The donor area for the pedicle flap was then injected with anesthesia.  The flap was excised through the skin and subcutaneous tissue down to the layer of the underlying musculature.  The pedicle flap was carefully excised within this deep plane to maintain its blood supply.  The edges of the donor site were undermined.   The donor site was closed in a primary fashion.  The pedicle was then rotated into position and sutured.  Once the tube was sutured into place, adequate blood supply was confirmed with blanching and refill.  The pedicle was then wrapped with xeroform gauze and dressed appropriately with a telfa and gauze bandage to ensure continued blood supply and protect the attached pedicle.

## 2023-09-02 NOTE — ED PROVIDER NOTE - CARE PLAN
Principal Discharge DX:	Adult failure to thrive  Secondary Diagnosis:	JOVON (acute kidney injury)   1

## 2023-09-02 NOTE — ED PROVIDER NOTE - PROGRESS NOTE DETAILS
Aristeo Mata MD PGY2: Labs with worsening renal function, otherwise unactionable. CT unremarkable. Discussed admission for ftt with pt, amenable at this time. Attending Annalise Gonzalez: bloodwork with elevated calcium, pt given IVF, no evidecen of hyperkalemia, ct scan ordered to further evaluate. which was negative for any acute surgical pathology or mass. plan to admit

## 2023-09-02 NOTE — H&P ADULT - PROBLEM SELECTOR PLAN 1
no localizing symptoms of infection , wbc 3.3 , UA negative , CXR clear , CT AP without significant abnormalities: suspect symptoms 2/2 hypercalcemia Vs uremia   - nutrition consult   - PT consult   - correct hypercalcemia   - IF symptoms persist despite correction of calcium level, can consider obtaining head imaging

## 2023-09-02 NOTE — ED PROVIDER NOTE - CLINICAL SUMMARY MEDICAL DECISION MAKING FREE TEXT BOX
Patient is a 73-year-old female past medical history significant for hypertension, CKD, hyperparathyroidism, anemia, NHL status post chemo presenting for generalized weakness. Currently vital signs initially stable within normal limits now bradycardic to 50s.  Complaining of worsening generalized weakness without any focal complaints or neurologic deficits on exam, no focal findings on exam.  Differential includes but not limited to infectious etiology including UTI versus pneumonia, versus anemia given history of anemia versus electrolyte abnormality versus malignancy.  To evaluate with labs, chest x-ray, urine, reassess.  Likely admit for failure to thrive if no improvement. Patient is a 73-year-old female past medical history significant for hypertension, CKD, hyperparathyroidism, anemia, NHL status post chemo presenting for generalized weakness. Currently vital signs initially stable within normal limits now bradycardic to 50s.  Complaining of worsening generalized weakness without any focal complaints or neurologic deficits on exam, no focal findings on exam.  Differential includes but not limited to infectious etiology including UTI versus pneumonia, versus anemia given history of anemia versus electrolyte abnormality versus malignancy.  To evaluate with labs, chest x-ray, urine, reassess.  Likely admit for failure to thrive if no improvement.  Attending Annalies Gonzalez: 72 yo female h/o CKD, htn, NHL with prior chemo, presenting with generalized weakness. upon arrival pt frail appearing. moving all extremities, with senseation and strength equal. no falls or trauma. symptoms have been getting progressively worse. vitals signs upon arrival with evidence of bradycardia. pt denies any chest pain. blood work sentot evaluate for electrolyte abnormalities, including hyperkalemia in the setting of bradycardia. pt also with h/o ckd.appears clinically dehydrated. given ivf. no fevers or chills. plan for labs, to evaluate for metabolic abnormaliteis, will check thyroid, UA. abdomen soft on exam. consider possible malignancy with weight loss and weakness. pt will need admission.

## 2023-09-02 NOTE — H&P ADULT - NSICDXPASTMEDICALHX_GEN_ALL_CORE_FT
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

## 2023-09-02 NOTE — ED ADULT NURSE REASSESSMENT NOTE - NS ED NURSE REASSESS COMMENT FT1
Patient straight cathed for urine using sterile technique. Second Tech Purvi present to confirm sterility. Explained procedure as it was being done - Pt tolerated procedure well. Sterile specimens collected and sent to lab as ordered. drained aprox 300ml of urine. Comfort and safety provided.

## 2023-09-02 NOTE — H&P ADULT - NSICDXPASTSURGICALHX_GEN_ALL_CORE_FT
PAST SURGICAL HISTORY:  bunionectomy right 2010, left 2011    H/O ventral hernia repair 2001    History of parathyroidectomy partial 2013    hysterectomy 1993    Liver disease liver resection secondary to nonhodgkin lymphoma 1999    S/P breast lumpectomy right 1995

## 2023-09-02 NOTE — ED PROVIDER NOTE - OBJECTIVE STATEMENT
Patient is a 73-year-old female past medical history significant for hypertension, CKD, hyperparathyroidism, anemia, NHL status post chemo presenting for generalized weakness.  Patient's family at bedside patient states that for the past many days to weeks she has been experiencing worsening generalized weakness without any known trigger, no specific complaints associated with it including dizziness, bleeding, pain.  Today was sitting in a chair when her neighbor with her noted she was so weak that she was unable to stand, was gently lowered to the floor without hitting head or true fall.  Was unable to get up by herself.  Typically ambulates without assistance, and performs all daily ADLs for herself and for her .  Currently stating that she has no specific complaints including dysuria, abdominal pain, chest pain, shortness of breath, fevers, chills, stool changes.  Just feels generalized weakness and tiredness.

## 2023-09-02 NOTE — ED ADULT NURSE NOTE - OBJECTIVE STATEMENT
73y F BIBEMS from home p/w weakness, status post witnessed single mechanical fall. Pt is axo3, ambulates with cane or independently at baseline. Daughter at bedside who is primary historian expresses that the pt has had an increase in weakness the last few days, decreased P.O. intake, and requiring more assistance. Also pt had a witnessed fall this morning sliding out of a chair, denies LOC, denies head injury. Patient undressed and placed into gown, call bell in hand and side rails up with bed in lowest position for safety. blanket provided. Comfort and safety provided. 73y F BIBEMS from home p/w weakness, status post witnessed single mechanical fall. Pt is axo3, ambulates with cane or independently at baseline. PMH of hypertension, CKD, hyperparathyroidism, anemia, NHL status post chemo. Daughter at bedside who is primary historian expresses that the pt has had an increase in weakness the last few days, decreased P.O. intake, and requiring more assistance. Also pt had a witnessed fall this morning sliding out of a chair, denies LOC, denies head injury. Patient undressed and placed into gown, call bell in hand and side rails up with bed in lowest position for safety. blanket provided. Comfort and safety provided.

## 2023-09-02 NOTE — H&P ADULT - ASSESSMENT
73F w/ hypertension, CKD V, hyperparathyroidism, anemia, NHL s/p chemo in remission, h/o beast cancer s/p b/l mastectomy   presents for generalized weakness for the past several days.

## 2023-09-02 NOTE — H&P ADULT - HISTORY OF PRESENT ILLNESS
Patient is a 73-year-old female past medical history significant for hypertension, CKD, hyperparathyroidism, anemia, NHL status post chemo,  presents for generalized weakness for the past several days.  Patient is a 73-year-old female past medical history significant for hypertension, CKD V, hyperparathyroidism, anemia, NHL status post chemo in remission, h/o beast cancer s/p b/l mastectomy   presents for generalized weakness for the past several days.   Per son, patient has progressive generalized weakness over the past several days, and on day of admission , was unable to stand  up from sitting and was lowered to the floor without sustaining trauma or injury. He reports patient has been more forgetful and confused over the past three months .  over the past week , patient has decreased appetite and decreased oral intake. She has nausea or vomiting. Patient reports no specific complaints at this time.  She reports no chest pain or shortness of breath , but has had episodes of shortness of breath in past.  She reports no fever or chills.   Of note , patient has been off her cinacalcet for the past several days.

## 2023-09-02 NOTE — H&P ADULT - NSHPPHYSICALEXAM_GEN_ALL_CORE
Vital Signs Last 24 Hrs  T(C): 36.4 (02 Sep 2023 23:43), Max: 37 (02 Sep 2023 15:39)  T(F): 97.5 (02 Sep 2023 23:43), Max: 98.6 (02 Sep 2023 15:39)  HR: 97 (02 Sep 2023 23:43) (68 - 97)  BP: 173/85 (02 Sep 2023 23:43) (130/72 - 173/85)  BP(mean): --  RR: 18 (02 Sep 2023 23:43) (17 - 18)  SpO2: 96% (02 Sep 2023 23:43) (96% - 98%)    Parameters below as of 02 Sep 2023 23:43  Patient On (Oxygen Delivery Method): room air Vital Signs Last 24 Hrs  T(C): 36.4 (02 Sep 2023 23:43), Max: 37 (02 Sep 2023 15:39)  T(F): 97.5 (02 Sep 2023 23:43), Max: 98.6 (02 Sep 2023 15:39)  HR: 97 (02 Sep 2023 23:43) (68 - 97)  BP: 173/85 (02 Sep 2023 23:43) (130/72 - 173/85)  BP(mean): --  RR: 18 (02 Sep 2023 23:43) (17 - 18)  SpO2: 96% (02 Sep 2023 23:43) (96% - 98%)    Parameters below as of 02 Sep 2023 23:43  Patient On (Oxygen Delivery Method): room air    GENERAL: No acute distress,  thin cachectic appearing , mild tremor , forgetful   HEAD:  Atraumatic, Normocephalic  ENT: EOMI, PERRLA, conjunctiva and sclera clear,  moist mucosa no pharyngeal erythema or exudates   NECK: supple , no JVD   CHEST/LUNG: Clear to auscultation bilaterally; No wheeze, equal breath sounds bilaterally   BACK: No spinal tenderness,  No CVA tenderness   HEART: Regular rate and rhythm; No murmurs, rubs, or gallops  ABDOMEN: Soft, Nontender, Nondistended; Bowel sounds present  EXTREMITIES:  No clubbing, cyanosis, or edema  MSK: No joint swelling or effusions, ROM intact   PSYCH: Normal behavior/affect  NEUROLOGY: AAOx2, non-focal, cranial nerves intact  SKIN: Normal color, No rashes or lesions

## 2023-09-02 NOTE — H&P ADULT - NSICDXFAMILYHX_GEN_ALL_CORE_FT
FAMILY HISTORY:  Father  Still living? No  Family history of essential hypertension, Age at diagnosis: Age Unknown  Family history of myocardial infarction, Age at diagnosis: Age Unknown    Mother  Still living? Unknown  Family history of essential hypertension, Age at diagnosis: Age Unknown    Sibling  Still living? No  Family history of early CAD, Age at diagnosis: Age Unknown  Family history of essential hypertension, Age at diagnosis: Age Unknown

## 2023-09-02 NOTE — H&P ADULT - NSHPLABSRESULTS_GEN_ALL_CORE
Labs personally reviewed:                          9.7    3.20  )-----------( 189      ( 02 Sep 2023 17:21 )             28.4         143  |  108  |  62<H>  ----------------------------<  120<H>  4.5   |  20<L>  |  4.74<H>    Ca    11.9<H>      02 Sep 2023 17:21  Mg     1.9         TPro  6.9  /  Alb  4.2  /  TBili  0.2  /  DBili  x   /  AST  20  /  ALT  21  /  AlkPhos  74          LIVER FUNCTIONS - ( 02 Sep 2023 17:21 )  Alb: 4.2 g/dL / Pro: 6.9 g/dL / ALK PHOS: 74 U/L / ALT: 21 U/L / AST: 20 U/L / GGT: x           PT/INR - ( 02 Sep 2023 17:21 )   PT: 10.7 sec;   INR: 1.02 ratio         PTT - ( 02 Sep 2023 17:21 )  PTT:25.6 sec  Urinalysis Basic - ( 02 Sep 2023 18:53 )    Color: Light Yellow / Appearance: Clear / S.013 / pH: x  Gluc: x / Ketone: Negative  / Bili: Negative / Urobili: Negative   Blood: x / Protein: 30 mg/dL / Nitrite: Negative   Leuk Esterase: Negative / RBC: 2 /hpf / WBC 0 /HPF   Sq Epi: x / Non Sq Epi: x / Bacteria: Negative      CAPILLARY BLOOD GLUCOSE          Imaging:  CT ap : No bowel obstruction or inflammation.    EKG personally reviewed: Labs personally reviewed:                          9.7    3.20  )-----------( 189      ( 02 Sep 2023 17:21 )             28.4         143  |  108  |  62<H>  ----------------------------<  120<H>  4.5   |  20<L>  |  4.74<H>    Ca    11.9<H>      02 Sep 2023 17:21  Mg     1.9         TPro  6.9  /  Alb  4.2  /  TBili  0.2  /  DBili  x   /  AST  20  /  ALT  21  /  AlkPhos  74          LIVER FUNCTIONS - ( 02 Sep 2023 17:21 )  Alb: 4.2 g/dL / Pro: 6.9 g/dL / ALK PHOS: 74 U/L / ALT: 21 U/L / AST: 20 U/L / GGT: x           PT/INR - ( 02 Sep 2023 17:21 )   PT: 10.7 sec;   INR: 1.02 ratio         PTT - ( 02 Sep 2023 17:21 )  PTT:25.6 sec  Urinalysis Basic - ( 02 Sep 2023 18:53 )    Color: Light Yellow / Appearance: Clear / S.013 / pH: x  Gluc: x / Ketone: Negative  / Bili: Negative / Urobili: Negative   Blood: x / Protein: 30 mg/dL / Nitrite: Negative   Leuk Esterase: Negative / RBC: 2 /hpf / WBC 0 /HPF   Sq Epi: x / Non Sq Epi: x / Bacteria: Negative      CAPILLARY BLOOD GLUCOSE          Imaging:  CT ap : No bowel obstruction or inflammation.  CXR ; no focal consolidation   EKG personally reviewed: sinus bradycardia at 48bpm

## 2023-09-02 NOTE — H&P ADULT - NSHPREVIEWOFSYSTEMS_GEN_ALL_CORE
CONSTITUTIONAL: +  weakness, no fevers or chills  EYES/ENT: No visual changes;  No dysphagia  NECK: No pain or stiffness  RESPIRATORY: No cough, wheezing, hemoptysis; +  shortness of breath  CARDIOVASCULAR: No chest pain or palpitations; No lower extremity edema  EXTREMITIES: no le edema, cyanosis, clubbing  GASTROINTESTINAL: No abdominal or epigastric pain. No nausea, vomiting, or hematemesis; No diarrhea or constipation. No melena or hematochezia.  BACK: No back pain  GENITOURINARY: No dysuria, frequency or hematuria  NEUROLOGICAL: No numbness or weakness  MSK: no joint swelling or pain  SKIN: No itching, burning, rashes, or lesions   PSYCH: no agitation  All other review of systems is negative unless indicated above.

## 2023-09-02 NOTE — H&P ADULT - PROBLEM SELECTOR PLAN 3
Cr 4.74, no recent baseline for comparison , family reports GFR < 15% at recent renal visit   - Renal consult - to be arranged by day team   - continue Sodium bicarb   - renal dose medications  - monitor ins and outs  - monitor creatinine   - avoid nephrotoxins

## 2023-09-03 DIAGNOSIS — R53.1 WEAKNESS: ICD-10-CM

## 2023-09-03 DIAGNOSIS — N18.5 CHRONIC KIDNEY DISEASE, STAGE 5: ICD-10-CM

## 2023-09-03 DIAGNOSIS — E83.52 HYPERCALCEMIA: ICD-10-CM

## 2023-09-03 DIAGNOSIS — Z29.9 ENCOUNTER FOR PROPHYLACTIC MEASURES, UNSPECIFIED: ICD-10-CM

## 2023-09-03 DIAGNOSIS — I10 ESSENTIAL (PRIMARY) HYPERTENSION: ICD-10-CM

## 2023-09-03 LAB
24R-OH-CALCIDIOL SERPL-MCNC: 56.3 NG/ML — SIGNIFICANT CHANGE UP (ref 30–80)
ALBUMIN SERPL ELPH-MCNC: 3.7 G/DL — SIGNIFICANT CHANGE UP (ref 3.3–5)
ALBUMIN SERPL ELPH-MCNC: 3.9 G/DL — SIGNIFICANT CHANGE UP (ref 3.3–5)
ALP SERPL-CCNC: 73 U/L — SIGNIFICANT CHANGE UP (ref 40–120)
ALP SERPL-CCNC: 75 U/L — SIGNIFICANT CHANGE UP (ref 40–120)
ALT FLD-CCNC: 23 U/L — SIGNIFICANT CHANGE UP (ref 10–45)
ALT FLD-CCNC: 23 U/L — SIGNIFICANT CHANGE UP (ref 10–45)
ANION GAP SERPL CALC-SCNC: 11 MMOL/L — SIGNIFICANT CHANGE UP (ref 5–17)
ANION GAP SERPL CALC-SCNC: 12 MMOL/L — SIGNIFICANT CHANGE UP (ref 5–17)
ANION GAP SERPL CALC-SCNC: 16 MMOL/L — SIGNIFICANT CHANGE UP (ref 5–17)
AST SERPL-CCNC: 23 U/L — SIGNIFICANT CHANGE UP (ref 10–40)
AST SERPL-CCNC: 25 U/L — SIGNIFICANT CHANGE UP (ref 10–40)
BILIRUB SERPL-MCNC: 0.2 MG/DL — SIGNIFICANT CHANGE UP (ref 0.2–1.2)
BILIRUB SERPL-MCNC: 0.3 MG/DL — SIGNIFICANT CHANGE UP (ref 0.2–1.2)
BUN SERPL-MCNC: 49 MG/DL — HIGH (ref 7–23)
BUN SERPL-MCNC: 50 MG/DL — HIGH (ref 7–23)
BUN SERPL-MCNC: 52 MG/DL — HIGH (ref 7–23)
CALCIUM SERPL-MCNC: 10.7 MG/DL — HIGH (ref 8.4–10.5)
CALCIUM SERPL-MCNC: 11.2 MG/DL — HIGH (ref 8.4–10.5)
CALCIUM SERPL-MCNC: 11.2 MG/DL — HIGH (ref 8.4–10.5)
CALCIUM SERPL-MCNC: 11.6 MG/DL — HIGH (ref 8.4–10.5)
CHLORIDE SERPL-SCNC: 110 MMOL/L — HIGH (ref 96–108)
CHLORIDE SERPL-SCNC: 111 MMOL/L — HIGH (ref 96–108)
CHLORIDE SERPL-SCNC: 113 MMOL/L — HIGH (ref 96–108)
CO2 SERPL-SCNC: 12 MMOL/L — LOW (ref 22–31)
CO2 SERPL-SCNC: 20 MMOL/L — LOW (ref 22–31)
CO2 SERPL-SCNC: 20 MMOL/L — LOW (ref 22–31)
CREAT SERPL-MCNC: 3.69 MG/DL — HIGH (ref 0.5–1.3)
CREAT SERPL-MCNC: 3.78 MG/DL — HIGH (ref 0.5–1.3)
CREAT SERPL-MCNC: 3.8 MG/DL — HIGH (ref 0.5–1.3)
EGFR: 12 ML/MIN/1.73M2 — LOW
GLUCOSE SERPL-MCNC: 122 MG/DL — HIGH (ref 70–99)
GLUCOSE SERPL-MCNC: 132 MG/DL — HIGH (ref 70–99)
GLUCOSE SERPL-MCNC: 135 MG/DL — HIGH (ref 70–99)
HCT VFR BLD CALC: 33.9 % — LOW (ref 34.5–45)
HCV AB S/CO SERPL IA: 0.06 S/CO — SIGNIFICANT CHANGE UP (ref 0–0.99)
HCV AB SERPL-IMP: SIGNIFICANT CHANGE UP
HGB BLD-MCNC: 10.4 G/DL — LOW (ref 11.5–15.5)
MCHC RBC-ENTMCNC: 28.5 PG — SIGNIFICANT CHANGE UP (ref 27–34)
MCHC RBC-ENTMCNC: 30.7 GM/DL — LOW (ref 32–36)
MCV RBC AUTO: 92.9 FL — SIGNIFICANT CHANGE UP (ref 80–100)
NRBC # BLD: 0 /100 WBCS — SIGNIFICANT CHANGE UP (ref 0–0)
PHOSPHATE SERPL-MCNC: 2.8 MG/DL — SIGNIFICANT CHANGE UP (ref 2.5–4.5)
PLATELET # BLD AUTO: 176 K/UL — SIGNIFICANT CHANGE UP (ref 150–400)
POTASSIUM SERPL-MCNC: 4 MMOL/L — SIGNIFICANT CHANGE UP (ref 3.5–5.3)
POTASSIUM SERPL-MCNC: 4.3 MMOL/L — SIGNIFICANT CHANGE UP (ref 3.5–5.3)
POTASSIUM SERPL-MCNC: 4.5 MMOL/L — SIGNIFICANT CHANGE UP (ref 3.5–5.3)
POTASSIUM SERPL-SCNC: 4 MMOL/L — SIGNIFICANT CHANGE UP (ref 3.5–5.3)
POTASSIUM SERPL-SCNC: 4.3 MMOL/L — SIGNIFICANT CHANGE UP (ref 3.5–5.3)
POTASSIUM SERPL-SCNC: 4.5 MMOL/L — SIGNIFICANT CHANGE UP (ref 3.5–5.3)
PROT SERPL-MCNC: 6.2 G/DL — SIGNIFICANT CHANGE UP (ref 6–8.3)
PROT SERPL-MCNC: 6.2 G/DL — SIGNIFICANT CHANGE UP (ref 6–8.3)
PROT SERPL-MCNC: 6.5 G/DL — SIGNIFICANT CHANGE UP (ref 6–8.3)
PROT SERPL-MCNC: 6.6 G/DL — SIGNIFICANT CHANGE UP (ref 6–8.3)
PTH-INTACT FLD-MCNC: 445 PG/ML — HIGH (ref 15–65)
RBC # BLD: 3.65 M/UL — LOW (ref 3.8–5.2)
RBC # FLD: 14.8 % — HIGH (ref 10.3–14.5)
SODIUM SERPL-SCNC: 141 MMOL/L — SIGNIFICANT CHANGE UP (ref 135–145)
SODIUM SERPL-SCNC: 141 MMOL/L — SIGNIFICANT CHANGE UP (ref 135–145)
SODIUM SERPL-SCNC: 143 MMOL/L — SIGNIFICANT CHANGE UP (ref 135–145)
VIT D25+D1,25 OH+D1,25 PNL SERPL-MCNC: 26.7 PG/ML — SIGNIFICANT CHANGE UP (ref 19.9–79.3)
WBC # BLD: 3.27 K/UL — LOW (ref 3.8–10.5)
WBC # FLD AUTO: 3.27 K/UL — LOW (ref 3.8–10.5)

## 2023-09-03 RX ORDER — NIFEDIPINE 30 MG
30 TABLET, EXTENDED RELEASE 24 HR ORAL DAILY
Refills: 0 | Status: DISCONTINUED | OUTPATIENT
Start: 2023-09-03 | End: 2023-09-06

## 2023-09-03 RX ORDER — HEPARIN SODIUM 5000 [USP'U]/ML
5000 INJECTION INTRAVENOUS; SUBCUTANEOUS EVERY 12 HOURS
Refills: 0 | Status: DISCONTINUED | OUTPATIENT
Start: 2023-09-03 | End: 2023-09-06

## 2023-09-03 RX ORDER — SODIUM CHLORIDE 9 MG/ML
1000 INJECTION, SOLUTION INTRAVENOUS
Refills: 0 | Status: DISCONTINUED | OUTPATIENT
Start: 2023-09-03 | End: 2023-09-04

## 2023-09-03 RX ORDER — ATORVASTATIN CALCIUM 80 MG/1
80 TABLET, FILM COATED ORAL AT BEDTIME
Refills: 0 | Status: DISCONTINUED | OUTPATIENT
Start: 2023-09-03 | End: 2023-09-06

## 2023-09-03 RX ORDER — ATENOLOL 25 MG/1
25 TABLET ORAL DAILY
Refills: 0 | Status: DISCONTINUED | OUTPATIENT
Start: 2023-09-03 | End: 2023-09-06

## 2023-09-03 RX ORDER — ATENOLOL 25 MG/1
1 TABLET ORAL
Refills: 0 | DISCHARGE

## 2023-09-03 RX ORDER — SODIUM BICARBONATE 1 MEQ/ML
0.11 SYRINGE (ML) INTRAVENOUS
Qty: 75 | Refills: 0 | Status: DISCONTINUED | OUTPATIENT
Start: 2023-09-03 | End: 2023-09-03

## 2023-09-03 RX ORDER — SODIUM CHLORIDE 9 MG/ML
1000 INJECTION INTRAMUSCULAR; INTRAVENOUS; SUBCUTANEOUS
Refills: 0 | Status: COMPLETED | OUTPATIENT
Start: 2023-09-03 | End: 2023-09-03

## 2023-09-03 RX ORDER — CINACALCET 30 MG/1
60 TABLET, FILM COATED ORAL EVERY 8 HOURS
Refills: 0 | Status: DISCONTINUED | OUTPATIENT
Start: 2023-09-03 | End: 2023-09-06

## 2023-09-03 RX ORDER — ONDANSETRON 8 MG/1
4 TABLET, FILM COATED ORAL EVERY 8 HOURS
Refills: 0 | Status: DISCONTINUED | OUTPATIENT
Start: 2023-09-03 | End: 2023-09-06

## 2023-09-03 RX ORDER — AMLODIPINE BESYLATE 2.5 MG/1
10 TABLET ORAL DAILY
Refills: 0 | Status: DISCONTINUED | OUTPATIENT
Start: 2023-09-03 | End: 2023-09-06

## 2023-09-03 RX ORDER — SODIUM BICARBONATE 1 MEQ/ML
1300 SYRINGE (ML) INTRAVENOUS
Refills: 0 | Status: DISCONTINUED | OUTPATIENT
Start: 2023-09-03 | End: 2023-09-06

## 2023-09-03 RX ORDER — LANOLIN ALCOHOL/MO/W.PET/CERES
3 CREAM (GRAM) TOPICAL AT BEDTIME
Refills: 0 | Status: DISCONTINUED | OUTPATIENT
Start: 2023-09-03 | End: 2023-09-06

## 2023-09-03 RX ORDER — INFLUENZA VIRUS VACCINE 15; 15; 15; 15 UG/.5ML; UG/.5ML; UG/.5ML; UG/.5ML
0.7 SUSPENSION INTRAMUSCULAR ONCE
Refills: 0 | Status: DISCONTINUED | OUTPATIENT
Start: 2023-09-03 | End: 2023-09-06

## 2023-09-03 RX ORDER — CINACALCET 30 MG/1
1 TABLET, FILM COATED ORAL
Refills: 0 | DISCHARGE

## 2023-09-03 RX ORDER — ATENOLOL 25 MG/1
25 TABLET ORAL DAILY
Refills: 0 | Status: DISCONTINUED | OUTPATIENT
Start: 2023-09-03 | End: 2023-09-03

## 2023-09-03 RX ORDER — SODIUM BICARBONATE 1 MEQ/ML
2 SYRINGE (ML) INTRAVENOUS
Refills: 0 | DISCHARGE

## 2023-09-03 RX ORDER — ACETAMINOPHEN 500 MG
650 TABLET ORAL EVERY 6 HOURS
Refills: 0 | Status: DISCONTINUED | OUTPATIENT
Start: 2023-09-03 | End: 2023-09-06

## 2023-09-03 RX ORDER — CHOLECALCIFEROL (VITAMIN D3) 125 MCG
1 CAPSULE ORAL
Qty: 0 | Refills: 0 | DISCHARGE

## 2023-09-03 RX ADMIN — SODIUM CHLORIDE 100 MILLILITER(S): 9 INJECTION INTRAMUSCULAR; INTRAVENOUS; SUBCUTANEOUS at 00:50

## 2023-09-03 RX ADMIN — Medication 30 MILLIGRAM(S): at 22:10

## 2023-09-03 RX ADMIN — CINACALCET 60 MILLIGRAM(S): 30 TABLET, FILM COATED ORAL at 13:06

## 2023-09-03 RX ADMIN — AMLODIPINE BESYLATE 10 MILLIGRAM(S): 2.5 TABLET ORAL at 00:48

## 2023-09-03 RX ADMIN — HEPARIN SODIUM 5000 UNIT(S): 5000 INJECTION INTRAVENOUS; SUBCUTANEOUS at 18:03

## 2023-09-03 RX ADMIN — Medication 1300 MILLIGRAM(S): at 06:44

## 2023-09-03 RX ADMIN — Medication 3 MILLIGRAM(S): at 21:31

## 2023-09-03 RX ADMIN — CINACALCET 60 MILLIGRAM(S): 30 TABLET, FILM COATED ORAL at 21:30

## 2023-09-03 RX ADMIN — Medication 1300 MILLIGRAM(S): at 18:03

## 2023-09-03 RX ADMIN — HEPARIN SODIUM 5000 UNIT(S): 5000 INJECTION INTRAVENOUS; SUBCUTANEOUS at 06:44

## 2023-09-03 RX ADMIN — ATORVASTATIN CALCIUM 80 MILLIGRAM(S): 80 TABLET, FILM COATED ORAL at 21:31

## 2023-09-03 RX ADMIN — CINACALCET 60 MILLIGRAM(S): 30 TABLET, FILM COATED ORAL at 06:43

## 2023-09-03 RX ADMIN — SODIUM CHLORIDE 100 MILLILITER(S): 9 INJECTION, SOLUTION INTRAVENOUS at 17:54

## 2023-09-03 RX ADMIN — CINACALCET 60 MILLIGRAM(S): 30 TABLET, FILM COATED ORAL at 00:49

## 2023-09-03 RX ADMIN — Medication 75 MEQ/KG/HR: at 13:06

## 2023-09-03 NOTE — PATIENT PROFILE ADULT - FALL HARM RISK - HARM RISK INTERVENTIONS
Assistance with ambulation/Assistance OOB with selected safe patient handling equipment/Communicate Risk of Fall with Harm to all staff/Discuss with provider need for PT consult/Monitor for mental status changes/Monitor gait and stability/Provide patient with walking aids - walker, cane, crutches/Reinforce activity limits and safety measures with patient and family/Reorient to person, place and time as needed/Review medications for side effects contributing to fall risk/Sit up slowly, dangle for a short time, stand at bedside before walking/Tailored Fall Risk Interventions/Use of alarms - bed, chair and/or voice tab/Visual Cue: Yellow wristband and red socks/Bed in lowest position, wheels locked, appropriate side rails in place/Call bell, personal items and telephone in reach/Instruct patient to call for assistance before getting out of bed or chair/Non-slip footwear when patient is out of bed/[place] to call system/Physically safe environment - no spills, clutter or unnecessary equipment/Purposeful Proactive Rounding/Room/bathroom lighting operational, light cord in reach

## 2023-09-03 NOTE — PHYSICAL THERAPY INITIAL EVALUATION ADULT - PERTINENT HX OF CURRENT PROBLEM, REHAB EVAL
73-year-old female past medical history significant for hypertension, CKD V, hyperparathyroidism, anemia, NHL status post chemo in remission, h/o beast cancer s/p b/l mastectomy presents for generalized weakness for the past several days.   Per son, patient has progressive generalized weakness over the past several days, and on day of admission , was unable to stand  up from sitting and was lowered to the floor without sustaining trauma or injury. He reports patient has been more forgetful and confused over the past three months .  over the past week , patient has decreased appetite and decreased oral intake. She has nausea or vomiting. Patient reports no specific complaints at this time.  She reports no chest pain or shortness of breath , but has had episodes of shortness of breath in past.  She reports no fever or chills.   Of note , patient has been off her cinacalcet for the past several days.

## 2023-09-03 NOTE — CONSULT NOTE ADULT - SUBJECTIVE AND OBJECTIVE BOX
HPI:  Patient is a 73-year-old female past medical history significant for hypertension, CKD V, hyperparathyroidism, anemia, NHL status post chemo in remission, h/o beast cancer s/p b/l mastectomy   presents for generalized weakness for the past several days.   Per son, patient has progressive generalized weakness over the past several days, and on day of admission , was unable to stand  up from sitting and was lowered to the floor without sustaining trauma or injury. He reports patient has been more forgetful and confused over the past three months .  over the past week , patient has decreased appetite and decreased oral intake. She has nausea or vomiting. Patient reports no specific complaints at this time.  She reports no chest pain or shortness of breath , but has had episodes of shortness of breath in past.  She reports no fever or chills.   Of note , patient has been off her cinacalcet for the past several days.    (02 Sep 2023 23:45)    Nephrology called for JOVON and Hypercalcemia. Patient sees Dr. Hinson from Nephrology as outpatient. She has CKD. No urinary sx at this time. She has hx of hypercalcemia managed by an Endocrinologist and she is on Cinacalcet which she takes regularly.   She is up in chair, eating, answegin questions appropriately but she is forgetful and she realized that.       PAST MEDICAL & SURGICAL HISTORY:  hyperparathyroidism      Hypertension      Gout      Lymphoma  nonhodgkin liver , chemotherapy 2000 and SX 1999      Seasonal allergies      Chronic renal insufficiency      Cerebral aneurysm, nonruptured      Osteoarthritis of spine with radiculopathy, lumbar region      Stroke  5/15/18 , no residual      Liver disease  liver resection secondary to nonhodgkin lymphoma 1999      S/P breast lumpectomy  right 1995      H/O ventral hernia repair  2001      hysterectomy  1993      bunionectomy  right 2010, left 2011      History of parathyroidectomy  partial 2013          MEDICATIONS  (STANDING):  amLODIPine   Tablet 10 milliGRAM(s) Oral daily  atenolol  Tablet 25 milliGRAM(s) Oral daily  atorvastatin 80 milliGRAM(s) Oral at bedtime  cinacalcet 60 milliGRAM(s) Oral every 8 hours  heparin   Injectable 5000 Unit(s) SubCutaneous every 12 hours  influenza  Vaccine (HIGH DOSE) 0.7 milliLiter(s) IntraMuscular once  sodium bicarbonate 1300 milliGRAM(s) Oral two times a day  sodium bicarbonate  Infusion 0.113 mEq/kG/Hr (75 mL/Hr) IV Continuous <Continuous>      Allergies    shellfish (Hives)  No Known Drug Allergies    Intolerances        SOCIAL HISTORY:  Denies ETOh,Smoking,     FAMILY HISTORY:  Family history of essential hypertension (Father, Mother, Sibling)    Family history of myocardial infarction (Father)    Family history of early CAD (Sibling)        REVIEW OF SYSTEMS:    As per HPI  All other review of systems is negative unless indicated above.    VITAL:  T(C): , Max: 37 (09-02-23 @ 15:39)  T(F): , Max: 98.6 (09-02-23 @ 15:39)  HR: 57 (09-03-23 @ 12:20)  BP: 174/79 (09-03-23 @ 12:20)  BP(mean): --  RR: 18 (09-03-23 @ 12:20)  SpO2: 100% (09-03-23 @ 04:12)  Wt(kg): --    I and O's:    09-02 @ 07:01  -  09-03 @ 07:00  --------------------------------------------------------  IN: 240 mL / OUT: 0 mL / NET: 240 mL      Height (cm): 157.5 (09-02 @ 15:39)  Weight (kg): 49.9 (09-02 @ 15:39)  BMI (kg/m2): 20.1 (09-02 @ 15:39)  BSA (m2): 1.48 (09-02 @ 15:39)    PHYSICAL EXAM:    Constitutional: NAD  HEENT: PERRLA, EOMI,  MMM  Neck: No LAD, No JVD  Respiratory: CTAB  Cardiovascular: S1 and S2  Gastrointestinal: BS+, soft, NT/ND  Extremities: No peripheral edema  Neurological: A/O x 2-3  Psychiatric: Normal mood, normal affect  : No Zavala      LABS:                        10.4   3.27  )-----------( 176      ( 03 Sep 2023 09:47 )             33.9     09-03    143  |  111<H>  |  49<H>  ----------------------------<  135<H>  4.0   |  20<L>  |  3.78<H>    Ca    11.2<H>      03 Sep 2023 12:22  Phos  2.8     09-03  Mg     1.9     09-02    TPro  6.6  /  Alb  3.9  /  TBili  0.2  /  DBili  x   /  AST  23  /  ALT  23  /  AlkPhos  73  09-03      Urine Studies:  Urinalysis Basic - ( 03 Sep 2023 12:22 )    Color: x / Appearance: x / SG: x / pH: x  Gluc: 135 mg/dL / Ketone: x  / Bili: x / Urobili: x   Blood: x / Protein: x / Nitrite: x   Leuk Esterase: x / RBC: x / WBC x   Sq Epi: x / Non Sq Epi: x / Bacteria: x            RADIOLOGY & ADDITIONAL STUDIES:        ASSESSMENT:    Patient is a 73-year-old female past medical history significant for hypertension, CKD V, hyperparathyroidism, anemia, NHL status post chemo in remission, h/o beast cancer s/p b/l mastectomy   presents for generalized weakness, JOVON and Hypercalcemia     - CKD ?Stage with unknown baseline serum creatinine  - JOVON likely pre-renal azotemia due to hypercalcemia- No hydro on CT   - Hypertension- uncontrolled   - Volume status- euvolemic  - Hypercalcemia- likely primary hyperparathyroidism > of malignancy    - Anemia  - NAGMA - improved         PLAN:   - Switch IVF to LR at 100 ml/hr  - No need for lasix unless she becomes volume overloaded  - Strict I/O  - BMP BID  - C/w Cinacalcet 60 mg TID- Increase as needed   - Check PTHrP, SPEP, and Phos, 25OH and 1,25OH vitamin D are WNL, PTH is very high   - No indication for Denosumab at this point  - Patient does not appear to be a good candidate for parathyroidectomy if indicated, would prefer medical therapy  - Add Procardia XL 30 mg po daily  - Renal dosing of meds to creatinine clearance of 15 ml/min  - Avoid nephrotoxins as able  - No indication for renal replacement therapy at this time      Thank you for the courtesy of the referral    Darline Martinez MD  [facility]  Cell: [phone]

## 2023-09-03 NOTE — PROGRESS NOTE ADULT - PROBLEM SELECTOR PLAN 1
likely secondary to JOVON on CKD and hypercalcemia  will continue to monitor  fall precautions   PT evaluation

## 2023-09-03 NOTE — PHYSICAL THERAPY INITIAL EVALUATION ADULT - ADDITIONAL COMMENTS
Pt lives in a house with  (unable to assist). Has 4 stairs to enter (-HR) and no stairs inside. Dtr reports pt is independent with functional mobility/ADLs without AD, however has been getting increasingly deconditioned/weak over the past few months. Pt amb with cane outdoors and lately has been ambulating holding onto furniture 2/2 unsteadiness. Owns cane.

## 2023-09-03 NOTE — PATIENT PROFILE ADULT - FUNCTIONAL ASSESSMENT - BASIC MOBILITY 6.
3-calculated by average/Not able to assess (calculate score using [facility] averaging method)

## 2023-09-03 NOTE — PROGRESS NOTE ADULT - SUBJECTIVE AND OBJECTIVE BOX
Patient is a 73y old  Female who presents with a chief complaint of generalized weakness x several days (02 Sep 2023 23:45)  patient not known to me, assigned this AM to assume care  chart reviewed and events thus far noted  admitted overnight by hospitalist colleague     DATE OF SERVICE: 09-03-23 @ 12:32    SUBJECTIVE / OVERNIGHT EVENTS: overnight events noted    ROS:  Resp: No cough no sputum production  CVS: No chest pain no palpitations no orthopnea  GI: no N/V/D        MEDICATIONS  (STANDING):  amLODIPine   Tablet 10 milliGRAM(s) Oral daily  atenolol  Tablet 25 milliGRAM(s) Oral daily  atorvastatin 80 milliGRAM(s) Oral at bedtime  cinacalcet 60 milliGRAM(s) Oral every 8 hours  heparin   Injectable 5000 Unit(s) SubCutaneous every 12 hours  influenza  Vaccine (HIGH DOSE) 0.7 milliLiter(s) IntraMuscular once  sodium bicarbonate 1300 milliGRAM(s) Oral two times a day  sodium bicarbonate  Infusion 0.113 mEq/kG/Hr (75 mL/Hr) IV Continuous <Continuous>    MEDICATIONS  (PRN):  acetaminophen     Tablet .. 650 milliGRAM(s) Oral every 6 hours PRN Temp greater or equal to 38C (100.4F), Mild Pain (1 - 3)  melatonin 3 milliGRAM(s) Oral at bedtime PRN Insomnia  ondansetron Injectable 4 milliGRAM(s) IV Push every 8 hours PRN Nausea and/or Vomiting        CAPILLARY BLOOD GLUCOSE        I&O's Summary    02 Sep 2023 07:01  -  03 Sep 2023 07:00  --------------------------------------------------------  IN: 240 mL / OUT: 0 mL / NET: 240 mL        Vital Signs Last 24 Hrs  T(C): 36.8 (03 Sep 2023 04:12), Max: 37 (02 Sep 2023 15:39)  T(F): 98.3 (03 Sep 2023 04:12), Max: 98.6 (02 Sep 2023 15:39)  HR: 57 (03 Sep 2023 04:12) (53 - 97)  BP: 170/81 (03 Sep 2023 04:12) (130/72 - 191/78)  BP(mean): --  RR: 18 (03 Sep 2023 04:12) (17 - 18)  SpO2: 100% (03 Sep 2023 04:12) (96% - 100%)    PHYSICAL EXAM: cachectic  GENERAL: in no apparent distress  HEAD:  Atraumatic, Normocephalic  EYES: EOMI, PERRLA,  NECK: Supple, No JVD  CHEST/LUNG: clear  HEART: S1 S2; systolic murmur +   ABDOMEN: Soft, Nontender  EXTREMITIES:  no edema  NEUROLOGY: AO x 3 non-focal  SKIN: No rashes or lesions    LABS:                        10.4   3.27  )-----------( 176      ( 03 Sep 2023 09:47 )             33.9     09-03    141  |  113<H>  |  50<H>  ----------------------------<  122<H>  4.3   |  12<L>  |  3.69<H>    Ca    11.2<H>      03 Sep 2023 09:47  Mg     1.9     09-02    TPro  6.5  /  Alb  3.7  /  TBili  0.3  /  DBili  x   /  AST  25  /  ALT  23  /  AlkPhos  75  09-03    PT/INR - ( 02 Sep 2023 17:21 )   PT: 10.7 sec;   INR: 1.02 ratio         PTT - ( 02 Sep 2023 17:21 )  PTT:25.6 sec      Urinalysis Basic - ( 03 Sep 2023 09:47 )    Color: x / Appearance: x / SG: x / pH: x  Gluc: 122 mg/dL / Ketone: x  / Bili: x / Urobili: x   Blood: x / Protein: x / Nitrite: x   Leuk Esterase: x / RBC: x / WBC x   Sq Epi: x / Non Sq Epi: x / Bacteria: x          All consultant(s) notes reviewed and care discussed with other providers        Contact Number, Dr Jimenes [phone]

## 2023-09-03 NOTE — PHYSICAL THERAPY INITIAL EVALUATION ADULT - GAIT DEVIATIONS NOTED, PT EVAL
decreased tano/increased time in double stance/decreased step length/decreased stride length/decreased weight-shifting ability

## 2023-09-03 NOTE — PATIENT PROFILE ADULT - STATED REASON FOR ADMISSION
patient states "I slipped out of my chair at home and I haven't been eating well." Admit diagnosis: failure to thrive in adult.

## 2023-09-03 NOTE — PHYSICAL THERAPY INITIAL EVALUATION ADULT - NSPTDISCHREC_GEN_A_CORE
with assist as needed. Dtr reports family is working on arranging HHA for pt and family will assist as needed until HHA in place./Home PT

## 2023-09-04 LAB
ANION GAP SERPL CALC-SCNC: 17 MMOL/L — SIGNIFICANT CHANGE UP (ref 5–17)
BUN SERPL-MCNC: 50 MG/DL — HIGH (ref 7–23)
CALCIUM SERPL-MCNC: 11.5 MG/DL — HIGH (ref 8.4–10.5)
CHLORIDE SERPL-SCNC: 110 MMOL/L — HIGH (ref 96–108)
CO2 SERPL-SCNC: 17 MMOL/L — LOW (ref 22–31)
CREAT SERPL-MCNC: 3.55 MG/DL — HIGH (ref 0.5–1.3)
CULTURE RESULTS: NO GROWTH — SIGNIFICANT CHANGE UP
EGFR: 13 ML/MIN/1.73M2 — LOW
GLUCOSE SERPL-MCNC: 81 MG/DL — SIGNIFICANT CHANGE UP (ref 70–99)
POTASSIUM SERPL-MCNC: 4.8 MMOL/L — SIGNIFICANT CHANGE UP (ref 3.5–5.3)
POTASSIUM SERPL-SCNC: 4.8 MMOL/L — SIGNIFICANT CHANGE UP (ref 3.5–5.3)
SODIUM SERPL-SCNC: 144 MMOL/L — SIGNIFICANT CHANGE UP (ref 135–145)
SPECIMEN SOURCE: SIGNIFICANT CHANGE UP

## 2023-09-04 RX ORDER — MIRTAZAPINE 45 MG/1
7.5 TABLET, ORALLY DISINTEGRATING ORAL AT BEDTIME
Refills: 0 | Status: DISCONTINUED | OUTPATIENT
Start: 2023-09-04 | End: 2023-09-05

## 2023-09-04 RX ADMIN — Medication 1300 MILLIGRAM(S): at 17:42

## 2023-09-04 RX ADMIN — AMLODIPINE BESYLATE 10 MILLIGRAM(S): 2.5 TABLET ORAL at 05:15

## 2023-09-04 RX ADMIN — HEPARIN SODIUM 5000 UNIT(S): 5000 INJECTION INTRAVENOUS; SUBCUTANEOUS at 05:15

## 2023-09-04 RX ADMIN — ATORVASTATIN CALCIUM 80 MILLIGRAM(S): 80 TABLET, FILM COATED ORAL at 21:31

## 2023-09-04 RX ADMIN — Medication 30 MILLIGRAM(S): at 05:15

## 2023-09-04 RX ADMIN — CINACALCET 60 MILLIGRAM(S): 30 TABLET, FILM COATED ORAL at 14:51

## 2023-09-04 RX ADMIN — MIRTAZAPINE 7.5 MILLIGRAM(S): 45 TABLET, ORALLY DISINTEGRATING ORAL at 21:31

## 2023-09-04 RX ADMIN — ATENOLOL 25 MILLIGRAM(S): 25 TABLET ORAL at 05:15

## 2023-09-04 RX ADMIN — CINACALCET 60 MILLIGRAM(S): 30 TABLET, FILM COATED ORAL at 05:15

## 2023-09-04 RX ADMIN — CINACALCET 60 MILLIGRAM(S): 30 TABLET, FILM COATED ORAL at 21:31

## 2023-09-04 RX ADMIN — HEPARIN SODIUM 5000 UNIT(S): 5000 INJECTION INTRAVENOUS; SUBCUTANEOUS at 17:42

## 2023-09-04 RX ADMIN — Medication 1300 MILLIGRAM(S): at 05:15

## 2023-09-04 NOTE — PROGRESS NOTE ADULT - PROBLEM SELECTOR PLAN 1
likely secondary to JOVON on discharge   improved   fall precautions   PT evaluation noted  home PT  Remeron 7.5 QHS

## 2023-09-04 NOTE — PROGRESS NOTE ADULT - SUBJECTIVE AND OBJECTIVE BOX
Patient is a 73y old  Female who presents with a chief complaint of generalized weakness x several days (04 Sep 2023 10:05)      DATE OF SERVICE: 09-04-23 @ 11:51    SUBJECTIVE / OVERNIGHT EVENTS: overnight events noted    ROS:  Resp: No cough no sputum production  CVS: No chest pain no palpitations no orthopnea  GI: no N/V/D  : no dysuria, no hematuria          MEDICATIONS  (STANDING):  amLODIPine   Tablet 10 milliGRAM(s) Oral daily  atenolol  Tablet 25 milliGRAM(s) Oral daily  atorvastatin 80 milliGRAM(s) Oral at bedtime  cinacalcet 60 milliGRAM(s) Oral every 8 hours  heparin   Injectable 5000 Unit(s) SubCutaneous every 12 hours  influenza  Vaccine (HIGH DOSE) 0.7 milliLiter(s) IntraMuscular once  NIFEdipine XL 30 milliGRAM(s) Oral daily  sodium bicarbonate 1300 milliGRAM(s) Oral two times a day    MEDICATIONS  (PRN):  acetaminophen     Tablet .. 650 milliGRAM(s) Oral every 6 hours PRN Temp greater or equal to 38C (100.4F), Mild Pain (1 - 3)  melatonin 3 milliGRAM(s) Oral at bedtime PRN Insomnia  ondansetron Injectable 4 milliGRAM(s) IV Push every 8 hours PRN Nausea and/or Vomiting        CAPILLARY BLOOD GLUCOSE        I&O's Summary    03 Sep 2023 07:01  -  04 Sep 2023 07:00  --------------------------------------------------------  IN: 150 mL / OUT: 0 mL / NET: 150 mL        Vital Signs Last 24 Hrs  T(C): 37.1 (04 Sep 2023 04:53), Max: 37.1 (04 Sep 2023 04:53)  T(F): 98.8 (04 Sep 2023 04:53), Max: 98.8 (04 Sep 2023 04:53)  HR: 64 (04 Sep 2023 04:53) (55 - 67)  BP: 138/85 (04 Sep 2023 04:53) (138/85 - 174/85)  BP(mean): --  RR: 18 (04 Sep 2023 04:53) (18 - 18)  SpO2: 98% (04 Sep 2023 04:53) (98% - 100%)      PHYSICAL EXAM:   EYES: EOMI, PERRLA,  NECK: Supple, No JVD  CHEST/LUNG: clear  HEART: S1 S2; systolic murmur +   ABDOMEN: Soft, Nontender  EXTREMITIES:  no edema  NEUROLOGY: AO x 3 non-focal  SKIN: No rashes or lesions    LABS:                        10.4   3.27  )-----------( 176      ( 03 Sep 2023 09:47 )             33.9     09-04    144  |  110<H>  |  50<H>  ----------------------------<  81  4.8   |  17<L>  |  3.55<H>    Ca    11.5<H>      04 Sep 2023 07:15  Phos  2.8     09-03  Mg     1.9     09-02    TPro  6.6  /  Alb  3.9  /  TBili  0.2  /  DBili  x   /  AST  23  /  ALT  23  /  AlkPhos  73  09-03    PT/INR - ( 02 Sep 2023 17:21 )   PT: 10.7 sec;   INR: 1.02 ratio         PTT - ( 02 Sep 2023 17:21 )  PTT:25.6 sec      Urinalysis Basic - ( 04 Sep 2023 07:15 )    Color: x / Appearance: x / SG: x / pH: x  Gluc: 81 mg/dL / Ketone: x  / Bili: x / Urobili: x   Blood: x / Protein: x / Nitrite: x   Leuk Esterase: x / RBC: x / WBC x   Sq Epi: x / Non Sq Epi: x / Bacteria: x          All consultant(s) notes reviewed and care discussed with other providers        Contact Number, Dr Jimenes [phone]

## 2023-09-04 NOTE — PROGRESS NOTE ADULT - SUBJECTIVE AND OBJECTIVE BOX
Overnight events noted      VITAL:  T(C): , Max: 37.1 (09-04-23 @ 04:53)  T(F): , Max: 98.8 (09-04-23 @ 04:53)  HR: 64 (09-04-23 @ 04:53)  BP: 138/85 (09-04-23 @ 04:53)  BP(mean): --  RR: 18 (09-04-23 @ 04:53)  SpO2: 98% (09-04-23 @ 04:53)  Wt(kg): --      PHYSICAL EXAM:  Constitutional: NAD  HEENT: PERRLA, EOMI,  MMM  Neck: No LAD, No JVD  Respiratory: CTAB  Cardiovascular: S1 and S2  Gastrointestinal: BS+, soft, NT/ND  Extremities: No peripheral edema  Neurological: A/O x 2-3  Psychiatric: Normal mood, normal affect  : No Zavala    LABS:                        10.4   3.27  )-----------( 176      ( 03 Sep 2023 09:47 )             33.9     Na(144)/K(4.8)/Cl(110)/HCO3(17)/BUN(50)/Cr(3.55)Glu(81)/Ca(11.5)/Mg(--)/PO4(--)    09-04 @ 07:15  Na(141)/K(4.5)/Cl(110)/HCO3(20)/BUN(52)/Cr(3.80)Glu(132)/Ca(10.7)/Mg(--)/PO4(--)    09-03 @ 19:15  Na(143)/K(4.0)/Cl(111)/HCO3(20)/BUN(49)/Cr(3.78)Glu(135)/Ca(11.2)/Mg(--)/PO4(2.8)    09-03 @ 12:22  Na(141)/K(4.3)/Cl(113)/HCO3(12)/BUN(50)/Cr(3.69)Glu(122)/Ca(11.2)/Mg(--)/PO4(--)    09-03 @ 09:47  Na(143)/K(4.5)/Cl(108)/HCO3(20)/BUN(62)/Cr(4.74)Glu(120)/Ca(11.9)/Mg(1.9)/PO4(--)    09-02 @ 17:21    PTH - 445  25D - 56  1,25D - 26      IMPRESSION:  73F w/ HTN, past NHL, past breast CA, and CKD4-5, 9/2/23 p/w generalized weakness and hypercalcemia    (1)CKD - stage 4-5 - unclear exact etiology  (2)JOVON - prerenal - improved relative to admission - the creatinine in the mid-3s is likely her baseline  (3)Hypercalcemia - primary hyperparathyroidism - suboptimal response to date to Cincalcet - we can give it 1 more day, but if the serum calcium fails to improve by tomorrow on Cinacalcet, I would evaluate for parathyroidectomy  (4)Metabolic acidosis - renally mediated - acceptable for now, on PO NaHCO3      RECOMMEND:  (1)D/C IVF  (2)Continue Cinacalcet as ordered  (3)BMP+Mg+PO4 daily; if Ca tomorrow is >11 despite Cinacalcet, would start workup for potential parathyroidectomy          Shun Gonzalez MD  [facility]  Office/on call physician: [phone]  Cell (7a-7p): [phone]       No pain, no sob      VITAL:  T(C): , Max: 37.1 (09-04-23 @ 04:53)  T(F): , Max: 98.8 (09-04-23 @ 04:53)  HR: 64 (09-04-23 @ 04:53)  BP: 138/85 (09-04-23 @ 04:53)  BP(mean): --  RR: 18 (09-04-23 @ 04:53)  SpO2: 98% (09-04-23 @ 04:53)  Wt(kg): --      PHYSICAL EXAM:  Constitutional: NAD  HEENT: PERRLA, EOMI,  MMM  Neck: No LAD, No JVD  Respiratory: CTAB  Cardiovascular: S1 and S2  Gastrointestinal: BS+, soft, NT/ND  Extremities: No peripheral edema  Neurological: A/O x 2-3  Psychiatric: Normal mood, normal affect  : No Zavala    LABS:                        10.4   3.27  )-----------( 176      ( 03 Sep 2023 09:47 )             33.9     Na(144)/K(4.8)/Cl(110)/HCO3(17)/BUN(50)/Cr(3.55)Glu(81)/Ca(11.5)/Mg(--)/PO4(--)    09-04 @ 07:15  Na(141)/K(4.5)/Cl(110)/HCO3(20)/BUN(52)/Cr(3.80)Glu(132)/Ca(10.7)/Mg(--)/PO4(--)    09-03 @ 19:15  Na(143)/K(4.0)/Cl(111)/HCO3(20)/BUN(49)/Cr(3.78)Glu(135)/Ca(11.2)/Mg(--)/PO4(2.8)    09-03 @ 12:22  Na(141)/K(4.3)/Cl(113)/HCO3(12)/BUN(50)/Cr(3.69)Glu(122)/Ca(11.2)/Mg(--)/PO4(--)    09-03 @ 09:47  Na(143)/K(4.5)/Cl(108)/HCO3(20)/BUN(62)/Cr(4.74)Glu(120)/Ca(11.9)/Mg(1.9)/PO4(--)    09-02 @ 17:21    PTH - 445  25D - 56  1,25D - 26      IMPRESSION:  73F w/ HTN, past NHL, past breast CA, and CKD4-5, 9/2/23 p/w generalized weakness and hypercalcemia    (1)CKD - stage 4-5 - unclear exact etiology  (2)JOVON - prerenal - improved relative to admission - the creatinine in the mid-3s is likely her baseline  (3)Hypercalcemia - primary hyperparathyroidism - suboptimal response to date to Cincalcet - we can give it 1 more day, but if the serum calcium fails to improve by tomorrow on Cinacalcet, I would evaluate for parathyroidectomy  (4)Metabolic acidosis - renally mediated - acceptable for now, on PO NaHCO3      RECOMMEND:  (1)D/C IVF  (2)Continue Cinacalcet as ordered  (3)BMP+Mg+PO4 daily; if Ca tomorrow is >11 despite Cinacalcet, would start workup for potential parathyroidectomy          Shun Gonzalez MD  [facility]  Office/on call physician: [phone]  Cell (7a-7p): [phone]

## 2023-09-05 LAB
% ALBUMIN: 59.6 % — SIGNIFICANT CHANGE UP
% ALPHA 1: 4.6 % — SIGNIFICANT CHANGE UP
% ALPHA 2: 10.1 % — SIGNIFICANT CHANGE UP
% BETA: 11 % — SIGNIFICANT CHANGE UP
% GAMMA: 14.7 % — SIGNIFICANT CHANGE UP
% M SPIKE: 1.9 % — SIGNIFICANT CHANGE UP
ALBUMIN SERPL ELPH-MCNC: 3.7 G/DL — SIGNIFICANT CHANGE UP (ref 3.6–5.5)
ALBUMIN/GLOB SERPL ELPH: 1.5 RATIO — SIGNIFICANT CHANGE UP
ALPHA1 GLOB SERPL ELPH-MCNC: 0.3 G/DL — SIGNIFICANT CHANGE UP (ref 0.1–0.4)
ALPHA2 GLOB SERPL ELPH-MCNC: 0.6 G/DL — SIGNIFICANT CHANGE UP (ref 0.5–1)
ANION GAP SERPL CALC-SCNC: 12 MMOL/L — SIGNIFICANT CHANGE UP (ref 5–17)
B-GLOBULIN SERPL ELPH-MCNC: 0.7 G/DL — SIGNIFICANT CHANGE UP (ref 0.5–1)
BUN SERPL-MCNC: 46 MG/DL — HIGH (ref 7–23)
CALCIUM SERPL-MCNC: 10.8 MG/DL — HIGH (ref 8.4–10.5)
CHLORIDE SERPL-SCNC: 112 MMOL/L — HIGH (ref 96–108)
CO2 SERPL-SCNC: 19 MMOL/L — LOW (ref 22–31)
CREAT SERPL-MCNC: 3.33 MG/DL — HIGH (ref 0.5–1.3)
EGFR: 14 ML/MIN/1.73M2 — LOW
GAMMA GLOBULIN: 0.9 G/DL — SIGNIFICANT CHANGE UP (ref 0.6–1.6)
GLUCOSE BLDC GLUCOMTR-MCNC: 103 MG/DL — HIGH (ref 70–99)
GLUCOSE SERPL-MCNC: 62 MG/DL — LOW (ref 70–99)
HCT VFR BLD CALC: 30.2 % — LOW (ref 34.5–45)
HGB BLD-MCNC: 10.2 G/DL — LOW (ref 11.5–15.5)
INTERPRETATION SERPL IFE-IMP: SIGNIFICANT CHANGE UP
M-SPIKE: 0.1 G/DL — HIGH (ref 0–0)
MAGNESIUM SERPL-MCNC: 1.5 MG/DL — LOW (ref 1.6–2.6)
MCHC RBC-ENTMCNC: 28.7 PG — SIGNIFICANT CHANGE UP (ref 27–34)
MCHC RBC-ENTMCNC: 33.8 GM/DL — SIGNIFICANT CHANGE UP (ref 32–36)
MCV RBC AUTO: 85.1 FL — SIGNIFICANT CHANGE UP (ref 80–100)
NRBC # BLD: 0 /100 WBCS — SIGNIFICANT CHANGE UP (ref 0–0)
PHOSPHATE SERPL-MCNC: 2.5 MG/DL — SIGNIFICANT CHANGE UP (ref 2.5–4.5)
PLATELET # BLD AUTO: 182 K/UL — SIGNIFICANT CHANGE UP (ref 150–400)
POTASSIUM SERPL-MCNC: 4.4 MMOL/L — SIGNIFICANT CHANGE UP (ref 3.5–5.3)
POTASSIUM SERPL-SCNC: 4.4 MMOL/L — SIGNIFICANT CHANGE UP (ref 3.5–5.3)
PROT PATTERN SERPL ELPH-IMP: SIGNIFICANT CHANGE UP
RBC # BLD: 3.55 M/UL — LOW (ref 3.8–5.2)
RBC # FLD: 14.9 % — HIGH (ref 10.3–14.5)
SODIUM SERPL-SCNC: 143 MMOL/L — SIGNIFICANT CHANGE UP (ref 135–145)
WBC # BLD: 4.46 K/UL — SIGNIFICANT CHANGE UP (ref 3.8–10.5)
WBC # FLD AUTO: 4.46 K/UL — SIGNIFICANT CHANGE UP (ref 3.8–10.5)

## 2023-09-05 RX ORDER — PAMIDRONATE DISODIUM 9 MG/ML
30 INJECTION, SOLUTION INTRAVENOUS ONCE
Refills: 0 | Status: COMPLETED | OUTPATIENT
Start: 2023-09-05 | End: 2023-09-05

## 2023-09-05 RX ADMIN — CINACALCET 60 MILLIGRAM(S): 30 TABLET, FILM COATED ORAL at 05:38

## 2023-09-05 RX ADMIN — Medication 1300 MILLIGRAM(S): at 05:39

## 2023-09-05 RX ADMIN — AMLODIPINE BESYLATE 10 MILLIGRAM(S): 2.5 TABLET ORAL at 05:38

## 2023-09-05 RX ADMIN — ATENOLOL 25 MILLIGRAM(S): 25 TABLET ORAL at 05:39

## 2023-09-05 RX ADMIN — MIRTAZAPINE 7.5 MILLIGRAM(S): 45 TABLET, ORALLY DISINTEGRATING ORAL at 21:11

## 2023-09-05 RX ADMIN — ATORVASTATIN CALCIUM 80 MILLIGRAM(S): 80 TABLET, FILM COATED ORAL at 21:10

## 2023-09-05 RX ADMIN — CINACALCET 60 MILLIGRAM(S): 30 TABLET, FILM COATED ORAL at 21:11

## 2023-09-05 RX ADMIN — PAMIDRONATE DISODIUM 65 MILLIGRAM(S): 9 INJECTION, SOLUTION INTRAVENOUS at 11:21

## 2023-09-05 RX ADMIN — HEPARIN SODIUM 5000 UNIT(S): 5000 INJECTION INTRAVENOUS; SUBCUTANEOUS at 05:39

## 2023-09-05 RX ADMIN — CINACALCET 60 MILLIGRAM(S): 30 TABLET, FILM COATED ORAL at 13:27

## 2023-09-05 RX ADMIN — Medication 30 MILLIGRAM(S): at 05:39

## 2023-09-05 RX ADMIN — HEPARIN SODIUM 5000 UNIT(S): 5000 INJECTION INTRAVENOUS; SUBCUTANEOUS at 17:34

## 2023-09-05 RX ADMIN — Medication 1300 MILLIGRAM(S): at 17:34

## 2023-09-05 NOTE — DIETITIAN INITIAL EVALUATION ADULT - ADD RECOMMEND
1. Recommend Discontinue Renal Restricted diet (not currently receiving renal replacement therapy). Defer diet/texture modification to medical team/SLP as indicated   2. Recommend adding Ensure Plus High Protein 2x/day to augment PO intakes of meals  3. Encourage PO intakes as tolerated. Honor food preferences as appropriate and available. Staff to provide assistance during meal times as warranted  4. Monitor PO intake, GI tolerance, skin integrity and labs. RD remains available if needed, pt is aware.   5. Malnutrition Sticker placed in pt. chart

## 2023-09-05 NOTE — DIETITIAN INITIAL EVALUATION ADULT - SIGNS/SYMPTOMS
mild/moderate muscle wasting and fat loss, suspected <75% of estimated energy needs for > 1 month  mild/moderate muscle wasting and fat loss, suspected < 75% of estimated energy needs for >/= 1 month

## 2023-09-05 NOTE — DIETITIAN INITIAL EVALUATION ADULT - PERTINENT LABORATORY DATA
09-05    143  |  112<H>  |  46<H>  ----------------------------<  62<L>  4.4   |  19<L>  |  3.33<H>    Ca    10.8<H>      05 Sep 2023 06:58  Phos  2.5     09-05  Mg     1.5     09-05    TPro  6.6  /  Alb  3.9  /  TBili  0.2  /  DBili  x   /  AST  23  /  ALT  23  /  AlkPhos  73  09-03    POCT Blood Glucose.: 103 mg/dL (09-05-23 @ 09:55)

## 2023-09-05 NOTE — DIETITIAN INITIAL EVALUATION ADULT - PHYSCIAL ASSESSMENT
continue home medications   Weights:  - Source: Patient   - UBW: 170 pounds   - Reported weight changes: Weight loss from UBW, unable to report when she was last at her UBW    Current Admission Weights:  - Dosing weight: 49.9 kg/ 110 pounds (9/2/23)  - Bedscale Weight obtained by RD: 55.3 kg/ 121.9 pounds (9/5/23)      Weight History per [facility]:  65.8 kg/ 145.1 pounds (7/31/23)    Weight Change: Weight loss of 23 pounds/ 16% X ~ 2 months     IBW:  110 pounds  %IBW: 111%

## 2023-09-05 NOTE — DIETITIAN INITIAL EVALUATION ADULT - NS FNS DIET ORDER
Diet, Regular:   For patients receiving Renal Replacement - No Protein Restr, No Conc K, No Conc Phos, Low Sodium (RENAL) (09-03-23 @ 00:21) [Active]

## 2023-09-05 NOTE — PROGRESS NOTE ADULT - NSPROGADDITIONALINFOA_GEN_ALL_CORE
discussed with patient in detail, expresses understanding of treatment plans.  discussed with daughter over the phone in detail Facundo 
discussed with patient in detail, expresses understanding of treatment plans.  discussed with daughter at bedside in detail

## 2023-09-05 NOTE — DIETITIAN INITIAL EVALUATION ADULT - PERTINENT MEDS FT
MEDICATIONS  (STANDING):  amLODIPine   Tablet 10 milliGRAM(s) Oral daily  atenolol  Tablet 25 milliGRAM(s) Oral daily  atorvastatin 80 milliGRAM(s) Oral at bedtime  cinacalcet 60 milliGRAM(s) Oral every 8 hours  heparin   Injectable 5000 Unit(s) SubCutaneous every 12 hours  influenza  Vaccine (HIGH DOSE) 0.7 milliLiter(s) IntraMuscular once  mirtazapine 7.5 milliGRAM(s) Oral at bedtime  NIFEdipine XL 30 milliGRAM(s) Oral daily  sodium bicarbonate 1300 milliGRAM(s) Oral two times a day    MEDICATIONS  (PRN):  acetaminophen     Tablet .. 650 milliGRAM(s) Oral every 6 hours PRN Temp greater or equal to 38C (100.4F), Mild Pain (1 - 3)  melatonin 3 milliGRAM(s) Oral at bedtime PRN Insomnia  ondansetron Injectable 4 milliGRAM(s) IV Push every 8 hours PRN Nausea and/or Vomiting

## 2023-09-05 NOTE — PROGRESS NOTE ADULT - SUBJECTIVE AND OBJECTIVE BOX
[facility] [phone]        Patient seen and examined         MEDICATIONS  (STANDING):  amLODIPine   Tablet 10 milliGRAM(s) Oral daily  atenolol  Tablet 25 milliGRAM(s) Oral daily  atorvastatin 80 milliGRAM(s) Oral at bedtime  cinacalcet 60 milliGRAM(s) Oral every 8 hours  heparin   Injectable 5000 Unit(s) SubCutaneous every 12 hours  influenza  Vaccine (HIGH DOSE) 0.7 milliLiter(s) IntraMuscular once  mirtazapine 7.5 milliGRAM(s) Oral at bedtime  NIFEdipine XL 30 milliGRAM(s) Oral daily  sodium bicarbonate 1300 milliGRAM(s) Oral two times a day      VITAL:  T(C): , Max: 36.8 (09-04-23 @ 21:44)  T(F): , Max: 98.2 (09-04-23 @ 21:44)  HR: 56 (09-05-23 @ 05:03)  BP: 168/82 (09-05-23 @ 05:03)  BP(mean): --  RR: 18 (09-05-23 @ 05:03)  SpO2: 100% (09-05-23 @ 05:03)  Wt(kg): --    I and O's:    09-04 @ 07:01  -  09-05 @ 07:00  --------------------------------------------------------  IN: 240 mL / OUT: 0 mL / NET: 240 mL          PHYSICAL EXAM:    Constitutional: NAD  Neck:  No JVD  Respiratory: CTAB/L  Cardiovascular: S1 and S2  Gastrointestinal: BS+, soft, NT/ND  Extremities: No peripheral edema  Neurological: A/O x 3, no focal deficits  Psychiatric: Normal mood, normal affect  : No Zavala  Skin: No rashes  Access: Not applicable    LABS:                        10.2   4.46  )-----------( 182      ( 05 Sep 2023 06:58 )             30.2     09-04    144  |  110<H>  |  50<H>  ----------------------------<  81  4.8   |  17<L>  |  3.55<H>    Ca    11.5<H>      04 Sep 2023 07:15  Phos  2.8     09-03    TPro  6.6  /  Alb  3.9  /  TBili  0.2  /  DBili  x   /  AST  23  /  ALT  23  /  AlkPhos  73  09-03          Urine Studies:  Urinalysis Basic - ( 04 Sep 2023 07:15 )    Color: x / Appearance: x / SG: x / pH: x  Gluc: 81 mg/dL / Ketone: x  / Bili: x / Urobili: x   Blood: x / Protein: x / Nitrite: x   Leuk Esterase: x / RBC: x / WBC x   Sq Epi: x / Non Sq Epi: x / Bacteria: x            RADIOLOGY & ADDITIONAL STUDIES:             [facility] [phone]        Patient seen and examined in bed. She was the same         MEDICATIONS  (STANDING):  amLODIPine   Tablet 10 milliGRAM(s) Oral daily  atenolol  Tablet 25 milliGRAM(s) Oral daily  atorvastatin 80 milliGRAM(s) Oral at bedtime  cinacalcet 60 milliGRAM(s) Oral every 8 hours  heparin   Injectable 5000 Unit(s) SubCutaneous every 12 hours  influenza  Vaccine (HIGH DOSE) 0.7 milliLiter(s) IntraMuscular once  mirtazapine 7.5 milliGRAM(s) Oral at bedtime  NIFEdipine XL 30 milliGRAM(s) Oral daily  sodium bicarbonate 1300 milliGRAM(s) Oral two times a day      VITAL:  T(C): , Max: 36.8 (09-04-23 @ 21:44)  T(F): , Max: 98.2 (09-04-23 @ 21:44)  HR: 56 (09-05-23 @ 05:03)  BP: 168/82 (09-05-23 @ 05:03)  BP(mean): --  RR: 18 (09-05-23 @ 05:03)  SpO2: 100% (09-05-23 @ 05:03)  Wt(kg): --    I and O's:    09-04 @ 07:01  -  09-05 @ 07:00  --------------------------------------------------------  IN: 240 mL / OUT: 0 mL / NET: 240 mL          PHYSICAL EXAM:    Constitutional: NAD  Neck:  No JVD  Respiratory: CTAB/L  Cardiovascular: S1 and S2  Gastrointestinal: BS+, soft, NT/ND  Extremities: No peripheral edema  Neurological: A/O x 3, no focal deficits  Psychiatric: Normal mood, normal affect  : No Zavala  Skin: No rashes  Access: Not applicable    LABS:                        10.2   4.46  )-----------( 182      ( 05 Sep 2023 06:58 )             30.2     09-04    144  |  110<H>  |  50<H>  ----------------------------<  81  4.8   |  17<L>  |  3.55<H>    Ca    11.5<H>      04 Sep 2023 07:15  Phos  2.8     09-03    TPro  6.6  /  Alb  3.9  /  TBili  0.2  /  DBili  x   /  AST  23  /  ALT  23  /  AlkPhos  73  09-03          Urine Studies:  Urinalysis Basic - ( 04 Sep 2023 07:15 )    Color: x / Appearance: x / SG: x / pH: x  Gluc: 81 mg/dL / Ketone: x  / Bili: x / Urobili: x   Blood: x / Protein: x / Nitrite: x   Leuk Esterase: x / RBC: x / WBC x   Sq Epi: x / Non Sq Epi: x / Bacteria: x            RADIOLOGY & ADDITIONAL STUDIES:          < from: CT Abdomen and Pelvis w/ Oral Cont (09.02.23 @ 20:39) >    ACC: [id number] EXAM:  CT ABDOMEN AND PELVIS OC   ORDERED BY: TIFFANIE CANO     PROCEDURE DATE:  09/02/2023          INTERPRETATION:  CLINICAL INFORMATION: Generalized weakness and abdominal   discomfort. History of chronic kidney disease and non-Hodgkin's lymphoma   status post chemotherapy and hepatectomy in 1999.    COMPARISON: CT abdomen and pelvis 9/13/2018.    CONTRAST/COMPLICATIONS:  IV Contrast: NONE  Evaluation of the visceral organs is limited without   intravenous contrast  Oral Contrast: Omnipaque 300  Complications: None reported at time of study completion    PROCEDURE:  CT of the Abdomen and Pelvis was performed.  Sagittal and coronal reformats were performed.    FINDINGS:  LOWER CHEST: Bibasilar subsegmental atelectasis. Cardiomegaly. Coronary   artery calcification. Small pericardial effusion.    LIVER: Status post left hepatectomy.  BILE DUCTS: Normal caliber.  GALLBLADDER: Within normal limits.  SPLEEN: Within normal limits.  PANCREAS: Within normal limits.  ADRENALS: Within normal limits.  KIDNEYS/URETERS: Bilateral renal cortical thinning. Bilateral renal   cysts, some of which measure higher in attenuation than simple fluid   likely representing proteinaceous or hemorrhagic cysts, not significantly   changed. Nonobstructing 2 mm right renal calculus. No hydronephrosis.   Left extrarenal pelvis.    BLADDER: Within normal limits.  REPRODUCTIVE ORGANS: Supracervical hysterectomy. No adnexal masses.    BOWEL: Colonic diverticulosis without diverticulitis. No bowel   obstruction or inflammation. Appendix is normal.  PERITONEUM: No ascites.  VESSELS: Atherosclerotic changes.  RETROPERITONEUM/LYMPH NODES: No lymphadenopathy.  ABDOMINAL WALL: Within normal limits.  BONES: Mild degenerative changes of the spine.    IMPRESSION:  No bowel obstruction or inflammation.        < from: Xray Chest 1 View AP/PA (09.02.23 @ 17:11) >    ACC: [id number] EXAM:  XR CHEST AP OR PA 1V   ORDERED BY:  AYLEEN BURROWS     PROCEDURE DATE:  09/02/2023          INTERPRETATION:  EXAMINATION: XR CHEST    CLINICAL INDICATION: weakness    TECHNIQUE: Single frontal, portable view of the chest was obtained.    COMPARISON: Chest x-ray None.    FINDINGS:  Aortic calcifications.  The heart is not accurately assessed in this AP projection.  The lungs are clear. There is no pleural effusion.  There is no pneumothorax.  No acute bony abnormality.    IMPRESSION:  Clear lungs.    --- End of Report ---           CHARLES DIXON MD; Resident Radiologist  This document has been electronically signed.  FADI AUGUSTINE MD; Attending Radiologist  This document has been electronically signed. Sep  3 [id number]    < end of copied text >  --- End of Report ---    < end of copied text >

## 2023-09-05 NOTE — DIETITIAN INITIAL EVALUATION ADULT - REASON INDICATOR FOR ASSESSMENT
MST Score 2 or >   Information obtained from: Review of pt's current medical record, interview with pt in her assigned room on [facility]

## 2023-09-05 NOTE — DIETITIAN INITIAL EVALUATION ADULT - ORAL INTAKE PTA/DIET HISTORY
Pt reports a decrease in her appetite and "hasn't been eating very good" due to being busy with taking care of her  who has Parkinson's Disease. Pt denies nausea, vomiting, diarrhea, or constipation. Denies difficulty chewing/swallowing. Confirms allergy to shellfish. Reports taking Vitamin D and Vitamin C at home.

## 2023-09-05 NOTE — DIETITIAN INITIAL EVALUATION ADULT - REASON FOR ADMISSION
Chart Reviewed, Events noted  " 73F w/ hypertension, CKD V, hyperparathyroidism, anemia, NHL s/p chemo in remission, h/o beast cancer s/p b/l mastectomy   presents for generalized weakness for the past several day"

## 2023-09-05 NOTE — PROGRESS NOTE ADULT - SUBJECTIVE AND OBJECTIVE BOX
Patient is a 73y old  Female who presents with a chief complaint of generalized weakness         DATE OF SERVICE: 09-05-23 @ 13:32    SUBJECTIVE / OVERNIGHT EVENTS: overnight events noted    ROS:  Resp: No cough no sputum production  CVS: No chest pain no palpitations no orthopnea  GI: no N/V/D          MEDICATIONS  (STANDING):  amLODIPine   Tablet 10 milliGRAM(s) Oral daily  atenolol  Tablet 25 milliGRAM(s) Oral daily  atorvastatin 80 milliGRAM(s) Oral at bedtime  cinacalcet 60 milliGRAM(s) Oral every 8 hours  heparin   Injectable 5000 Unit(s) SubCutaneous every 12 hours  influenza  Vaccine (HIGH DOSE) 0.7 milliLiter(s) IntraMuscular once  mirtazapine 7.5 milliGRAM(s) Oral at bedtime  NIFEdipine XL 30 milliGRAM(s) Oral daily  sodium bicarbonate 1300 milliGRAM(s) Oral two times a day    MEDICATIONS  (PRN):  acetaminophen     Tablet .. 650 milliGRAM(s) Oral every 6 hours PRN Temp greater or equal to 38C (100.4F), Mild Pain (1 - 3)  melatonin 3 milliGRAM(s) Oral at bedtime PRN Insomnia  ondansetron Injectable 4 milliGRAM(s) IV Push every 8 hours PRN Nausea and/or Vomiting        CAPILLARY BLOOD GLUCOSE      POCT Blood Glucose.: 103 mg/dL (05 Sep 2023 09:55)    I&O's Summary    04 Sep 2023 07:01  -  05 Sep 2023 07:00  --------------------------------------------------------  IN: 240 mL / OUT: 0 mL / NET: 240 mL        Vital Signs Last 24 Hrs  T(C): 36.3 (05 Sep 2023 05:03), Max: 36.8 (04 Sep 2023 21:44)  T(F): 97.4 (05 Sep 2023 05:03), Max: 98.2 (04 Sep 2023 21:44)  HR: 56 (05 Sep 2023 05:03) (56 - 57)  BP: 168/82 (05 Sep 2023 05:03) (156/76 - 168/82)  BP(mean): --  RR: 18 (05 Sep 2023 05:03) (18 - 18)  SpO2: 100% (05 Sep 2023 05:03) (97% - 100%)    PHYSICAL EXAM:   EYES: EOMI, PERRLA,  NECK: Supple, No JVD  CHEST/LUNG: clear  HEART: S1 S2; systolic murmur +   ABDOMEN: Soft, Nontender  EXTREMITIES:  no edema  NEUROLOGY: AO x 3 non-focal  SKIN: No rashes or lesions    LABS:                        10.2   4.46  )-----------( 182      ( 05 Sep 2023 06:58 )             30.2     09-05    143  |  112<H>  |  46<H>  ----------------------------<  62<L>  4.4   |  19<L>  |  3.33<H>    Ca    10.8<H>      05 Sep 2023 06:58  Phos  2.5     09-05  Mg     1.5     09-05            Urinalysis Basic - ( 05 Sep 2023 06:58 )    Color: x / Appearance: x / SG: x / pH: x  Gluc: 62 mg/dL / Ketone: x  / Bili: x / Urobili: x   Blood: x / Protein: x / Nitrite: x   Leuk Esterase: x / RBC: x / WBC x   Sq Epi: x / Non Sq Epi: x / Bacteria: x          All consultant(s) notes reviewed and care discussed with other providers        Contact Number, Dr Jimenes [phone]

## 2023-09-05 NOTE — PROGRESS NOTE ADULT - PROBLEM SELECTOR PLAN 1
likely secondary to JOVON on discharge   improved   continue Remeron 7.5 qhs likely secondary to JOVON and hypercalcemia likely secondary to primary hyperparathyroidism present on admission   improving   continue Remeron 7.5 qhs

## 2023-09-06 ENCOUNTER — TRANSCRIPTION ENCOUNTER (OUTPATIENT)
Age: 73
End: 2023-09-06

## 2023-09-06 VITALS
RESPIRATION RATE: 18 BRPM | DIASTOLIC BLOOD PRESSURE: 64 MMHG | HEART RATE: 65 BPM | TEMPERATURE: 99 F | OXYGEN SATURATION: 100 % | SYSTOLIC BLOOD PRESSURE: 106 MMHG

## 2023-09-06 LAB
ANION GAP SERPL CALC-SCNC: 13 MMOL/L — SIGNIFICANT CHANGE UP (ref 5–17)
BUN SERPL-MCNC: 52 MG/DL — HIGH (ref 7–23)
CALCIUM SERPL-MCNC: 10.5 MG/DL — SIGNIFICANT CHANGE UP (ref 8.4–10.5)
CHLORIDE SERPL-SCNC: 111 MMOL/L — HIGH (ref 96–108)
CO2 SERPL-SCNC: 19 MMOL/L — LOW (ref 22–31)
CREAT SERPL-MCNC: 3.6 MG/DL — HIGH (ref 0.5–1.3)
EGFR: 13 ML/MIN/1.73M2 — LOW
GLUCOSE SERPL-MCNC: 72 MG/DL — SIGNIFICANT CHANGE UP (ref 70–99)
HCT VFR BLD CALC: 29.6 % — LOW (ref 34.5–45)
HGB BLD-MCNC: 9.9 G/DL — LOW (ref 11.5–15.5)
MAGNESIUM SERPL-MCNC: 1.4 MG/DL — LOW (ref 1.6–2.6)
MCHC RBC-ENTMCNC: 29 PG — SIGNIFICANT CHANGE UP (ref 27–34)
MCHC RBC-ENTMCNC: 33.4 GM/DL — SIGNIFICANT CHANGE UP (ref 32–36)
MCV RBC AUTO: 86.8 FL — SIGNIFICANT CHANGE UP (ref 80–100)
NRBC # BLD: 0 /100 WBCS — SIGNIFICANT CHANGE UP (ref 0–0)
PLATELET # BLD AUTO: 167 K/UL — SIGNIFICANT CHANGE UP (ref 150–400)
POTASSIUM SERPL-MCNC: 4.5 MMOL/L — SIGNIFICANT CHANGE UP (ref 3.5–5.3)
POTASSIUM SERPL-SCNC: 4.5 MMOL/L — SIGNIFICANT CHANGE UP (ref 3.5–5.3)
RBC # BLD: 3.41 M/UL — LOW (ref 3.8–5.2)
RBC # FLD: 15.2 % — HIGH (ref 10.3–14.5)
SODIUM SERPL-SCNC: 143 MMOL/L — SIGNIFICANT CHANGE UP (ref 135–145)
WBC # BLD: 5.19 K/UL — SIGNIFICANT CHANGE UP (ref 3.8–10.5)
WBC # FLD AUTO: 5.19 K/UL — SIGNIFICANT CHANGE UP (ref 3.8–10.5)

## 2023-09-06 PROCEDURE — 81001 URINALYSIS AUTO W/SCOPE: CPT

## 2023-09-06 PROCEDURE — 85027 COMPLETE CBC AUTOMATED: CPT

## 2023-09-06 PROCEDURE — 85730 THROMBOPLASTIN TIME PARTIAL: CPT

## 2023-09-06 PROCEDURE — 87086 URINE CULTURE/COLONY COUNT: CPT

## 2023-09-06 PROCEDURE — 83735 ASSAY OF MAGNESIUM: CPT

## 2023-09-06 PROCEDURE — 84100 ASSAY OF PHOSPHORUS: CPT

## 2023-09-06 PROCEDURE — 83970 ASSAY OF PARATHORMONE: CPT

## 2023-09-06 PROCEDURE — 83519 RIA NONANTIBODY: CPT

## 2023-09-06 PROCEDURE — 82947 ASSAY GLUCOSE BLOOD QUANT: CPT

## 2023-09-06 PROCEDURE — 85610 PROTHROMBIN TIME: CPT

## 2023-09-06 PROCEDURE — 97161 PT EVAL LOW COMPLEX 20 MIN: CPT

## 2023-09-06 PROCEDURE — 85025 COMPLETE CBC W/AUTO DIFF WBC: CPT

## 2023-09-06 PROCEDURE — 82652 VIT D 1 25-DIHYDROXY: CPT

## 2023-09-06 PROCEDURE — 99285 EMERGENCY DEPT VISIT HI MDM: CPT

## 2023-09-06 PROCEDURE — 74176 CT ABD & PELVIS W/O CONTRAST: CPT | Mod: MA

## 2023-09-06 PROCEDURE — 84132 ASSAY OF SERUM POTASSIUM: CPT

## 2023-09-06 PROCEDURE — 80053 COMPREHEN METABOLIC PANEL: CPT

## 2023-09-06 PROCEDURE — 76937 US GUIDE VASCULAR ACCESS: CPT

## 2023-09-06 PROCEDURE — 85018 HEMOGLOBIN: CPT

## 2023-09-06 PROCEDURE — 84165 PROTEIN E-PHORESIS SERUM: CPT

## 2023-09-06 PROCEDURE — 82310 ASSAY OF CALCIUM: CPT

## 2023-09-06 PROCEDURE — 71045 X-RAY EXAM CHEST 1 VIEW: CPT

## 2023-09-06 PROCEDURE — 83605 ASSAY OF LACTIC ACID: CPT

## 2023-09-06 PROCEDURE — 85014 HEMATOCRIT: CPT

## 2023-09-06 PROCEDURE — 86803 HEPATITIS C AB TEST: CPT

## 2023-09-06 PROCEDURE — 84155 ASSAY OF PROTEIN SERUM: CPT

## 2023-09-06 PROCEDURE — 84295 ASSAY OF SERUM SODIUM: CPT

## 2023-09-06 PROCEDURE — 36415 COLL VENOUS BLD VENIPUNCTURE: CPT

## 2023-09-06 PROCEDURE — 82435 ASSAY OF BLOOD CHLORIDE: CPT

## 2023-09-06 PROCEDURE — 82330 ASSAY OF CALCIUM: CPT

## 2023-09-06 PROCEDURE — 82803 BLOOD GASES ANY COMBINATION: CPT

## 2023-09-06 PROCEDURE — 86900 BLOOD TYPING SEROLOGIC ABO: CPT

## 2023-09-06 PROCEDURE — 82962 GLUCOSE BLOOD TEST: CPT

## 2023-09-06 PROCEDURE — 84484 ASSAY OF TROPONIN QUANT: CPT

## 2023-09-06 PROCEDURE — 84443 ASSAY THYROID STIM HORMONE: CPT

## 2023-09-06 PROCEDURE — 84436 ASSAY OF TOTAL THYROXINE: CPT

## 2023-09-06 PROCEDURE — 84439 ASSAY OF FREE THYROXINE: CPT

## 2023-09-06 PROCEDURE — 86850 RBC ANTIBODY SCREEN: CPT

## 2023-09-06 PROCEDURE — 84480 ASSAY TRIIODOTHYRONINE (T3): CPT

## 2023-09-06 PROCEDURE — 80048 BASIC METABOLIC PNL TOTAL CA: CPT

## 2023-09-06 PROCEDURE — 86334 IMMUNOFIX E-PHORESIS SERUM: CPT

## 2023-09-06 PROCEDURE — 82306 VITAMIN D 25 HYDROXY: CPT

## 2023-09-06 PROCEDURE — 86901 BLOOD TYPING SEROLOGIC RH(D): CPT

## 2023-09-06 RX ORDER — NIFEDIPINE 30 MG
1 TABLET, EXTENDED RELEASE 24 HR ORAL
Qty: 0 | Refills: 0 | DISCHARGE
Start: 2023-09-06

## 2023-09-06 RX ORDER — MAGNESIUM SULFATE 500 MG/ML
1 VIAL (ML) INJECTION ONCE
Refills: 0 | Status: COMPLETED | OUTPATIENT
Start: 2023-09-06 | End: 2023-09-06

## 2023-09-06 RX ORDER — ROSUVASTATIN CALCIUM 5 MG/1
1 TABLET ORAL
Refills: 0 | DISCHARGE

## 2023-09-06 RX ORDER — ROSUVASTATIN CALCIUM 5 MG/1
1 TABLET ORAL
Qty: 30 | Refills: 0
Start: 2023-09-06 | End: 2023-10-05

## 2023-09-06 RX ORDER — ATORVASTATIN CALCIUM 80 MG/1
40 TABLET, FILM COATED ORAL AT BEDTIME
Refills: 0 | Status: DISCONTINUED | OUTPATIENT
Start: 2023-09-06 | End: 2023-09-06

## 2023-09-06 RX ORDER — AMLODIPINE BESYLATE 2.5 MG/1
1 TABLET ORAL
Refills: 0 | DISCHARGE

## 2023-09-06 RX ADMIN — ATENOLOL 25 MILLIGRAM(S): 25 TABLET ORAL at 05:30

## 2023-09-06 RX ADMIN — Medication 30 MILLIGRAM(S): at 05:30

## 2023-09-06 RX ADMIN — CINACALCET 60 MILLIGRAM(S): 30 TABLET, FILM COATED ORAL at 05:30

## 2023-09-06 RX ADMIN — Medication 1300 MILLIGRAM(S): at 05:30

## 2023-09-06 RX ADMIN — AMLODIPINE BESYLATE 10 MILLIGRAM(S): 2.5 TABLET ORAL at 05:30

## 2023-09-06 RX ADMIN — Medication 100 GRAM(S): at 09:53

## 2023-09-06 RX ADMIN — CINACALCET 60 MILLIGRAM(S): 30 TABLET, FILM COATED ORAL at 14:50

## 2023-09-06 RX ADMIN — HEPARIN SODIUM 5000 UNIT(S): 5000 INJECTION INTRAVENOUS; SUBCUTANEOUS at 05:33

## 2023-09-06 NOTE — PROGRESS NOTE ADULT - PROBLEM SELECTOR PLAN 2
continue to monitor  renal help appreciated  will continue to follow recommendations
continue to monitor  Aredia x 1 dose  calcium now normal   cleared for discharge by renal and neuro
continue to monitor  up again today  renal help appreciated  will continue to follow recommendations
continue to monitor  Aredia x 1 dose

## 2023-09-06 NOTE — PROGRESS NOTE ADULT - SUBJECTIVE AND OBJECTIVE BOX
[facility] [phone]        Patient seen and examined in bed.  She was the same         MEDICATIONS  (STANDING):  amLODIPine   Tablet 10 milliGRAM(s) Oral daily  atenolol  Tablet 25 milliGRAM(s) Oral daily  atorvastatin 80 milliGRAM(s) Oral at bedtime  cinacalcet 60 milliGRAM(s) Oral every 8 hours  heparin   Injectable 5000 Unit(s) SubCutaneous every 12 hours  influenza  Vaccine (HIGH DOSE) 0.7 milliLiter(s) IntraMuscular once  magnesium sulfate  IVPB 1 Gram(s) IV Intermittent once  NIFEdipine XL 30 milliGRAM(s) Oral daily  sodium bicarbonate 1300 milliGRAM(s) Oral two times a day      VITAL:  T(C): , Max: 37.4 (09-05-23 @ 22:04)  T(F): , Max: 99.4 (09-05-23 @ 22:04)  HR: 61 (09-06-23 @ 05:02)  BP: 156/73 (09-06-23 @ 05:02)  BP(mean): --  RR: 18 (09-06-23 @ 05:02)  SpO2: 100% (09-06-23 @ 05:02)  Wt(kg): --    I and O's:    09-05 @ 07:01  -  09-06 @ 07:00  --------------------------------------------------------  IN: 360 mL / OUT: 700 mL / NET: -340 mL          PHYSICAL EXAM:    Constitutional: NAD  Neck:  No JVD  Respiratory: CTAB/L  Cardiovascular: S1 and S2  Gastrointestinal: BS+, soft, NT/ND  Extremities: No peripheral edema  Neurological: A/O x 3, no focal deficits  Psychiatric: Normal mood, normal affect  : No Zavala  Skin: No rashes  Access: Not applicable    LABS:                        9.9    5.19  )-----------( 167      ( 06 Sep 2023 07:15 )             29.6     09-06    143  |  111<H>  |  52<H>  ----------------------------<  72  4.5   |  19<L>  |  3.60<H>    Ca    10.5      06 Sep 2023 07:20  Phos  2.5     09-05  Mg     1.4     09-06            Urine Studies:  Urinalysis Basic - ( 06 Sep 2023 07:20 )    Color: x / Appearance: x / SG: x / pH: x  Gluc: 72 mg/dL / Ketone: x  / Bili: x / Urobili: x   Blood: x / Protein: x / Nitrite: x   Leuk Esterase: x / RBC: x / WBC x   Sq Epi: x / Non Sq Epi: x / Bacteria: x            RADIOLOGY & ADDITIONAL STUDIES:

## 2023-09-06 NOTE — PROGRESS NOTE ADULT - SUBJECTIVE AND OBJECTIVE BOX
Neurology Consult    Reason for Consult: Patient is a 73y old  Female who presents with a chief complaint of generalized weakness x several days (05 Sep 2023 13:32)      HPI:  Patient is a 73-year-old female past medical history significant for hypertension, CKD V, hyperparathyroidism, anemia, NHL status post chemo in remission, h/o beast cancer s/p b/l mastectomy   presents for generalized weakness for the past several days.   Per son, patient has progressive generalized weakness over the past several days, and on day of admission , was unable to stand  up from sitting and was lowered to the floor without sustaining trauma or injury. He reports patient has been more forgetful and confused over the past three months .  over the past week , patient has decreased appetite and decreased oral intake. She has nausea or vomiting. Patient reports no specific complaints at this time.  She reports no chest pain or shortness of breath , but has had episodes of shortness of breath in past.  She reports no fever or chills.   Of note , patient has been off her cinacalcet for the past several days.    (02 Sep 2023 23:45)       PAST MEDICAL & SURGICAL HISTORY:  hyperparathyroidism      Hypertension      Gout      Lymphoma  nonhodgkin liver , chemotherapy 2000 and SX 1999      Seasonal allergies      Chronic renal insufficiency      Cerebral aneurysm, nonruptured      Osteoarthritis of spine with radiculopathy, lumbar region      Stroke  5/15/18 , no residual      Liver disease  liver resection secondary to nonhodgkin lymphoma 1999      S/P breast lumpectomy  right 1995      H/O ventral hernia repair  2001      hysterectomy  1993      bunionectomy  right 2010, left 2011      History of parathyroidectomy  partial 2013          Allergies: Allergies    shellfish (Hives)  No Known Drug Allergies    Intolerances        Social History: Denies toxic habits including tobacco, ETOH or illicit drugs.    Family History: FAMILY HISTORY:  Family history of essential hypertension (Father, Mother, Sibling)    Family history of myocardial infarction (Father)    Family history of early CAD (Sibling)    . No family history of strokes    Medications: MEDICATIONS  (STANDING):  amLODIPine   Tablet 10 milliGRAM(s) Oral daily  atenolol  Tablet 25 milliGRAM(s) Oral daily  atorvastatin 80 milliGRAM(s) Oral at bedtime  cinacalcet 60 milliGRAM(s) Oral every 8 hours  heparin   Injectable 5000 Unit(s) SubCutaneous every 12 hours  influenza  Vaccine (HIGH DOSE) 0.7 milliLiter(s) IntraMuscular once  magnesium sulfate  IVPB 1 Gram(s) IV Intermittent once  NIFEdipine XL 30 milliGRAM(s) Oral daily  sodium bicarbonate 1300 milliGRAM(s) Oral two times a day    MEDICATIONS  (PRN):  acetaminophen     Tablet .. 650 milliGRAM(s) Oral every 6 hours PRN Temp greater or equal to 38C (100.4F), Mild Pain (1 - 3)  melatonin 3 milliGRAM(s) Oral at bedtime PRN Insomnia  ondansetron Injectable 4 milliGRAM(s) IV Push every 8 hours PRN Nausea and/or Vomiting      Review of Systems:  CONSTITUTIONAL:  generalized weakness    HEENT:  Eyes:  No visual loss, blurred vision, double vision or yellow sclera. Ears, Nose, Throat:  No hearing loss, sneezing, congestion, runny nose or sore throat.  SKIN:  No rash or itching.  CARDIOVASCULAR:  No chest pain, chest pressure or chest discomfort. No palpitations or edema.  RESPIRATORY:  No shortness of breath, cough or sputum.  GASTROINTESTINAL:  No anorexia, nausea, vomiting or diarrhea. No abdominal pain or blood.  GENITOURINARY:  No burning on urination or incontinence   NEUROLOGICAL:  No headache, dizziness, syncope, paralysis, ataxia, numbness or tingling in the extremities. No change in bowel or bladder control. no limb weakness. no vision changes.   MUSCULOSKELETAL:  No muscle, back pain, joint pain or stiffness.  HEMATOLOGIC:  No anemia, bleeding or bruising.  LYMPHATICS:  No enlarged nodes. No history of splenectomy.  PSYCHIATRIC:  No history of depression or anxiety.  ENDOCRINOLOGIC:  No reports of sweating, cold or heat intolerance. No polyuria or polydipsia.      Vitals:  Vital Signs Last 24 Hrs  T(C): 36.8 (06 Sep 2023 05:02), Max: 37.4 (05 Sep 2023 22:04)  T(F): 98.3 (06 Sep 2023 05:02), Max: 99.4 (05 Sep 2023 22:04)  HR: 61 (06 Sep 2023 05:02) (61 - 64)  BP: 156/73 (06 Sep 2023 05:02) (134/66 - 156/73)  BP(mean): --  RR: 18 (06 Sep 2023 05:02) (18 - 18)  SpO2: 100% (06 Sep 2023 05:02) (99% - 100%)    Parameters below as of 06 Sep 2023 05:02  Patient On (Oxygen Delivery Method): room air        General Exam:   General Appearance: Appropriately dressed and in no acute distress       Head: Normocephalic, atraumatic and no dysmorphic features  Ear, Nose, and Throat: Moist mucous membranes  CVS: S1S2+  Resp: No SOB, no wheeze or rhonchi  GI: soft NT/ND  Extremities: No edema or cyanosis  Skin: No bruises or rashes     Neurological Exam:  Mental Status: Awake, alert and oriented x2.  Able to follow simple and complex verbal commands. Able to name and repeat. fluent speech. No obvious aphasia or dysarthria noted.   Cranial Nerves: PERRL, EOMI, VFFC, sensation V1-V3 intact,  no obvious facial asymmetry, equal elevation of palate, scm/trap 5/5, tongue is midline on protrusion. no obvious papilledema on fundoscopic exam. hearing is grossly intact.   Motor: generalized weakness moving uppers > lowers.   uppers 4-5/5, lowers 2/5   Sensation: Intact to light touch and pinprick throughout. no right/left confusion.     Coordination: No dysmetria on FNF   Gait: cane baseline     Data/Labs/Imaging which I personally reviewed.     Labs:     CBC Full  -  ( 06 Sep 2023 07:15 )  WBC Count : 5.19 K/uL  RBC Count : 3.41 M/uL  Hemoglobin : 9.9 g/dL  Hematocrit : 29.6 %  Platelet Count - Automated : 167 K/uL  Mean Cell Volume : 86.8 fl  Mean Cell Hemoglobin : 29.0 pg  Mean Cell Hemoglobin Concentration : 33.4 gm/dL  Auto Neutrophil # : x  Auto Lymphocyte # : x  Auto Monocyte # : x  Auto Eosinophil # : x  Auto Basophil # : x  Auto Neutrophil % : x  Auto Lymphocyte % : x  Auto Monocyte % : x  Auto Eosinophil % : x  Auto Basophil % : x    09-06    143  |  111<H>  |  52<H>  ----------------------------<  72  4.5   |  19<L>  |  3.60<H>    Ca    10.5      06 Sep 2023 07:20  Phos  2.5     09-05  Mg     1.4     09-06         Urinalysis Basic - ( 06 Sep 2023 07:20 )    Color: x / Appearance: x / SG: x / pH: x  Gluc: 72 mg/dL / Ketone: x  / Bili: x / Urobili: x   Blood: x / Protein: x / Nitrite: x   Leuk Esterase: x / RBC: x / WBC x   Sq Epi: x / Non Sq Epi: x / Bacteria: x          < from: MR Lumbar Spine No Cont (08.17.21 @ 09:06) >    EXAM:  MR SPINE LUMBAR        PROCEDURE DATE:  08/17/2021           INTERPRETATION:  CLINICAL INDICATION: Lumbar spondylosis. Lower back muscle strain.    Multiplanar Multisequence MR of the LUMBAR SPINE    Prior Studies: None.    FINDINGS:    SEGMENTATION: There is transitional lumbosacral segment with broad hypertrophied transverse processes which articulate with the sacrum. For the purposes of this dictation this vertebral body shall be termed L5 and the last well-formed disc space shall be termed L5-S1. Definitive enumeration of the spine can be performed with total spine radiographs as clinically indicated. Using this nomenclature findings are as follows:    ALIGNMENT: There is slight dextro curvature of the lumbar spine. Lumbar lordosis is maintained. There is mild to moderate degenerative grade 1 anterolisthesis of L4 on L5.    VERTEBRAL BODIES: No acute compression deformity.    DISC SPACES: There is disc desiccation and mild disc height loss at L4/L5.    MARROW: Within normal limits.    SACROILIAC JOINTS: Intact.    CONUS AND CAUDA EQUINA: Conus is normal in morphology terminating at the level of T12.    IMAGED ABDOMINAL AND PELVIC STRUCTURES: Renal cortical atrophy is noted. Right-sided renal cysts are demonstrated. Small left-sided renal cysts are also noted. Additional smaller hypointense T2 foci are seen within both kidneys which may be related to hemorrhagic cyst. Dedicated MRI of the kidneys can be performed to further characterize. There is colonic diverticulosis.    The findings at the individual levels are as follows:    T11/T12: Minimal disc bulge without spinal canal or neural foraminal narrowing.    T12/L1: There is no spinal canal or neural foraminal narrowing.    L1/2: There is no spinal canal or neural foraminal narrowing.    L2/3: Mild bilateral facet arthrosis. There is a small left foraminal disc protrusion. There is mild left neural foraminal narrowing. There is no central canal narrowing.    L3/4: There is a mild diffuse disc bulge. There is moderate to severe bilateral facet arthrosis, greater on the left. There is mild ligamentum flavum hypertrophy. Findings result in mild flattening of the ventral thecal sac. There is mild bilateral neural foraminal narrowing, right greater than left.    L4/5: There is uncovering of the posterior disc with a diffuse disc bulge and central annular fissure. Bilateral facet arthrosis is noted. There is bilateral neural foraminal narrowing, mild to moderate on the right and mild on the left. There is mild flattening of the ventral thecal sac with mild right lateral recess narrowing. There is no central canal narrowing.    L5/S1: There is no spinal canal or neural foraminal narrowing.    IMPRESSION:    Transitional lumbosacral anatomy and enumeration as outlined above.    Multilevel lumbar spondylosis. Slight dextro scoliosis of the lumbar spine. Mild to moderate degenerative grade 1 anterolisthesis of L4 on L5.    L2/3: There is mild left neural foraminal narrowing.    L3/4: There is moderate to severe bilateral facet arthrosis, greater on the left. There is mild bilateral neural foraminal narrowing, right greater than left.    L4/5: There is bilateral neural foraminal narrowing, mild to moderate on the right and mild on the left. There is mild flattening of the ventral thecal sac with mild right lateral recess narrowing.    Atrophic appearing kidneys with bilateral renal cysts. Some hypointense foci are seen within both kidneys which may be related to hemorrhagic cyst. Confirmation with dedicated renal MRI can be performed.    --- End of Report ---               PEEWEE SCRUGGS MD; Attending Radiologist   This document has been electronically signed. Aug 23 2021  3:20PM    < end of copied text >

## 2023-09-06 NOTE — PROGRESS NOTE ADULT - PROBLEM SELECTOR PLAN 3
baseline creatinine unclear  renal help requested   creatinine improved
creatinine at baseline ~ 3.5  renal help appreciated  outpatient follow up with Dr. Hinson renal  PCP Dr Kaleb mon
baseline creatinine unclear ? 3.7  renal help appreciated
creatinine improved further  renal help appreciated

## 2023-09-06 NOTE — PROGRESS NOTE ADULT - SUBJECTIVE AND OBJECTIVE BOX
Patient is a 73y old  Female who presents with a chief complaint of generalized weakness x several days (06 Sep 2023 09:38)      DATE OF SERVICE: 09-06-23 @ 12:17    SUBJECTIVE / OVERNIGHT EVENTS: overnight events noted    ROS:  Resp: No cough no sputum production  CVS: No chest pain no palpitations no orthopnea  GI: no N/V/D  "I feel fine'         MEDICATIONS  (STANDING):  atenolol  Tablet 25 milliGRAM(s) Oral daily  atorvastatin 40 milliGRAM(s) Oral at bedtime  cinacalcet 60 milliGRAM(s) Oral every 8 hours  heparin   Injectable 5000 Unit(s) SubCutaneous every 12 hours  influenza  Vaccine (HIGH DOSE) 0.7 milliLiter(s) IntraMuscular once  NIFEdipine XL 30 milliGRAM(s) Oral daily  sodium bicarbonate 1300 milliGRAM(s) Oral two times a day    MEDICATIONS  (PRN):  acetaminophen     Tablet .. 650 milliGRAM(s) Oral every 6 hours PRN Temp greater or equal to 38C (100.4F), Mild Pain (1 - 3)  melatonin 3 milliGRAM(s) Oral at bedtime PRN Insomnia  ondansetron Injectable 4 milliGRAM(s) IV Push every 8 hours PRN Nausea and/or Vomiting        CAPILLARY BLOOD GLUCOSE        I&O's Summary    05 Sep 2023 07:01  -  06 Sep 2023 07:00  --------------------------------------------------------  IN: 360 mL / OUT: 700 mL / NET: -340 mL    06 Sep 2023 07:01  -  06 Sep 2023 12:17  --------------------------------------------------------  IN: 240 mL / OUT: 0 mL / NET: 240 mL        Vital Signs Last 24 Hrs  T(C): 36.8 (06 Sep 2023 05:02), Max: 37.4 (05 Sep 2023 22:04)  T(F): 98.3 (06 Sep 2023 05:02), Max: 99.4 (05 Sep 2023 22:04)  HR: 61 (06 Sep 2023 05:02) (61 - 64)  BP: 156/73 (06 Sep 2023 05:02) (134/66 - 156/73)  BP(mean): --  RR: 18 (06 Sep 2023 05:02) (18 - 18)  SpO2: 100% (06 Sep 2023 05:02) (99% - 100%)      PHYSICAL EXAM:   EYES: EOMI, PERRLA,  NECK: Supple, No JVD  CHEST/LUNG: clear  HEART: S1 S2; systolic murmur +   ABDOMEN: Soft, Nontender  EXTREMITIES:  no edema  NEUROLOGY: AO x 3 non-focal  SKIN: No rashes or lesions    LABS:                        9.9    5.19  )-----------( 167      ( 06 Sep 2023 07:15 )             29.6     09-06    143  |  111<H>  |  52<H>  ----------------------------<  72  4.5   |  19<L>  |  3.60<H>    Ca    10.5      06 Sep 2023 07:20  Phos  2.5     09-05  Mg     1.4     09-06            Urinalysis Basic - ( 06 Sep 2023 07:20 )    Color: x / Appearance: x / SG: x / pH: x  Gluc: 72 mg/dL / Ketone: x  / Bili: x / Urobili: x   Blood: x / Protein: x / Nitrite: x   Leuk Esterase: x / RBC: x / WBC x   Sq Epi: x / Non Sq Epi: x / Bacteria: x          All consultant(s) notes reviewed and care discussed with other providers        Contact Number, Dr Jimenes [phone]

## 2023-09-06 NOTE — PROGRESS NOTE ADULT - PROBLEM SELECTOR PLAN 1
likely secondary to JOVON and hypercalcemia likely secondary to primary hyperparathyroidism present on admission   improving   Remeron discontinued at son's request

## 2023-09-06 NOTE — DISCHARGE NOTE PROVIDER - CARE PROVIDER_API CALL
Phillip Manuel  Cardiovascular Disease  [address] [facility], [address] [place] [id number]  Phone: [phone]  Fax: [phone]  Follow Up Time: 1 week   Phillip Manuel  Cardiovascular Disease  [address] [facility]  [place] [address]  Phone: [phone]  Fax: [phone]  Follow Up Time: 1 week    Nephrologist Dr. Hinson,   Phone: (   )    -  Fax: (   )    -  Follow Up Time: 1 week

## 2023-09-06 NOTE — DISCHARGE NOTE NURSING/CASE MANAGEMENT/SOCIAL WORK - PATIENT PORTAL LINK FT
You can access the FollowMyHealth Patient Portal offered by [facility] by registering at the following website: [email or web address]. By joining [facility]’s FollowMyHealth portal, you will also be able to view your health information using other applications (apps) compatible with our system.

## 2023-09-06 NOTE — DISCHARGE NOTE NURSING/CASE MANAGEMENT/SOCIAL WORK - NSDCPEFALRISK_GEN_ALL_CORE
For information on Fall & Injury Prevention, visit: [email or web address] OR  [email or web address] OR  https://www.cdc.gov/steadi/patient.html

## 2023-09-06 NOTE — DISCHARGE NOTE PROVIDER - NSDCCPCAREPLAN_GEN_ALL_CORE_FT
PRINCIPAL DISCHARGE DIAGNOSIS  Diagnosis: Adult failure to thrive  Assessment and Plan of Treatment: Likely in setting of JOVON and hypercalcemia secondary to primary hyperparathyroidism present on admission   Likely not a candidate for parathyroidectomy but should have a scan outpatient with PCP   Follow-up with your primary care physician within 1 week. Call for appointment.  Please bring all discharge paperwork and list of medications to all follow up appointments  Please call for follow up appointments one day after discharge        SECONDARY DISCHARGE DIAGNOSES  Diagnosis: Hypercalcemia  Assessment and Plan of Treatment: Improved s/p one time dose of Aredia  Follow-up with your primary care physician within 1 week. Call for appointment.  Please bring all discharge paperwork and list of medications to all follow up appointments  Please call for follow up appointments one day after discharge      Diagnosis: JOVON (acute kidney injury)  Assessment and Plan of Treatment: Please follow up with your nephrologist within 1 week for repeat basic metabolic panel to trend creatinine level

## 2023-09-06 NOTE — PROGRESS NOTE ADULT - NUTRITIONAL ASSESSMENT
This patient has been assessed with a concern for Malnutrition and has been determined to have a diagnosis/diagnoses of Moderate protein-calorie malnutrition.    This patient is being managed with:   Diet Regular-  Supplement Feeding Modality:  Oral  Ensure Plus High Protein Cans or Servings Per Day:  2       Frequency:  Daily  Entered: Sep  5 2023 11:02AM  
This patient has been assessed with a concern for Malnutrition and has been determined to have a diagnosis/diagnoses of Moderate protein-calorie malnutrition.    This patient is being managed with:   Diet Regular-  Supplement Feeding Modality:  Oral  Ensure Plus High Protein Cans or Servings Per Day:  2       Frequency:  Daily  Entered: Sep  5 2023 11:02AM

## 2023-09-06 NOTE — PROGRESS NOTE ADULT - PROVIDER SPECIALTY LIST ADULT
Nephrology
Internal Medicine
Nephrology
Neurology
Nephrology
Internal Medicine

## 2023-09-06 NOTE — PROGRESS NOTE ADULT - REASON FOR ADMISSION
generalized weakness x several days

## 2023-09-06 NOTE — DISCHARGE NOTE PROVIDER - HOSPITAL COURSE
HPI: Patient is a 73-year-old female past medical history significant for hypertension, CKD V, hyperparathyroidism, anemia, NHL status post chemo in remission, h/o beast cancer s/p b/l mastectomy   presents for generalized weakness for the past several days.   Per son, patient has progressive generalized weakness over the past several days, and on day of admission , was unable to stand  up from sitting and was lowered to the floor without sustaining trauma or injury. He reports patient has been more forgetful and confused over the past three months .  over the past week , patient has decreased appetite and decreased oral intake. She has nausea or vomiting. Patient reports no specific complaints at this time.  She reports no chest pain or shortness of breath , but has had episodes of shortness of breath in past.  She reports no fever or chills.   Of note , patient has been off her cinacalcet for the past several days.    (02 Sep 2023 23:45)    Hospital Course: Patient was admitted for further medical management. CT abdomen/pelvis negative. Labwork notable for Hypercalcemia and JOVON on CKD- nephrology was consulted s/p pamdronate, not a candidate for parathyroidectomy, prefer medal therapy JOVON on CKD5: not on HD, renal consulted, no indication for urgent HD     CKD - stage 4-5 - unclear exact etiology; JOVON - prerenal - improved relative to admission - the creatinine in the mid-3s is likely her baseline per nephro. Patient received 1x dose of pamdronate for hypercalcemia - primary hyperparathyroidism or secondary  - suboptimal response to date to Cincalcet -   Hypomagnesemia sp IV mg++ this am    (4)Metabolic acidosis - renally mediated - acceptable for now, on PO NaHCO3      RECOMMEND:  (1)SP aredia and the calcium is normalizing   (2)Continue Cinacalcet as ordered;  No evidence of bony mets;  Gamma migrating paraprotein identified but there is no globular load and minimal M spike   (3)Honestly too old for parathyroidectomy but should have a scan         Important Medication Changes and Reason:  Decreased rosuvastatin to 10mg qd for stroke prevention     Active or Pending Issues Requiring Follow-up:  Follow up with nephrology in 1 week for continued monitoring of renal function     Advanced Directives:   [x] Full code  [ ] DNR  [ ] Hospice    Discharge Diagnoses:  Generalized weakness  Hypercalcemia  Stage 5 chronic kidney disease not on chronic dialysis   HTN          HPI: Patient is a 73-year-old female past medical history significant for hypertension, CKD V, hyperparathyroidism, anemia, NHL status post chemo in remission, h/o beast cancer s/p b/l mastectomy   presents for generalized weakness for the past several days.   Per son, patient has progressive generalized weakness over the past several days, and on day of admission , was unable to stand  up from sitting and was lowered to the floor without sustaining trauma or injury. He reports patient has been more forgetful and confused over the past three months .  over the past week , patient has decreased appetite and decreased oral intake. She has nausea or vomiting. Patient reports no specific complaints at this time.  She reports no chest pain or shortness of breath , but has had episodes of shortness of breath in past.  She reports no fever or chills.   Of note , patient has been off her cinacalcet for the past several days.    (02 Sep 2023 23:45)    Hospital Course: Patient was admitted for further medical management. CT abdomen/pelvis negative. Lab work notable for JOVON and hypercalcemia likely secondary to primary hyperparathyroidism present on admission - nephrology was consulted. Calcium level improved s/p pamdronate, Regarding JOVON on CKD5 - no indication for urgent HD likely prerenal - improved relative to admission - the creatinine in the mid-3s is likely her baseline per nephro. Patient to continue PO NaHCO3 for Metabolic acidosis - renally mediated. Continue Cinacalcet as ordered;  No evidence of bony mets;  Gamma migrating paraprotein identified but there is no globular load and minimal M spike . Likely not a candidtate for parathyroidectomy but should have a scan outpatient.         Important Medication Changes and Reason:  Decreased rosuvastatin to 10mg qd for stroke prevention     Active or Pending Issues Requiring Follow-up:  Follow up with nephrology in 1 week for continued monitoring of renal function  and PCP Dr Manuel     Advanced Directives:   [x] Full code  [ ] DNR  [ ] Hospice    Discharge Diagnoses:  Generalized weakness  Hypercalcemia  Stage 5 chronic kidney disease not on chronic dialysis   HTN          HPI: Patient is a 73-year-old female past medical history significant for hypertension, CKD V, hyperparathyroidism, anemia, NHL status post chemo in remission, h/o beast cancer s/p b/l mastectomy   presents for generalized weakness for the past several days.   Per son, patient has progressive generalized weakness over the past several days, and on day of admission , was unable to stand  up from sitting and was lowered to the floor without sustaining trauma or injury. He reports patient has been more forgetful and confused over the past three months .  over the past week , patient has decreased appetite and decreased oral intake. She has nausea or vomiting. Patient reports no specific complaints at this time.  She reports no chest pain or shortness of breath , but has had episodes of shortness of breath in past.  She reports no fever or chills.   Of note , patient has been off her cinacalcet for the past several days.    (02 Sep 2023 23:45)    Hospital Course: Patient was admitted for further medical management. CT abdomen/pelvis negative. Lab work notable for JOVON and hypercalcemia likely secondary to primary hyperparathyroidism present on admission - nephrology was consulted. Calcium level improved s/p pamdronate, Regarding JOVON on CKD5 - no indication for urgent HD likely prerenal - improved relative to admission - the creatinine in the mid-3s is likely her baseline per nephro. Patient to continue PO NaHCO3 for Metabolic acidosis - renally mediated. Continue Cinacalcet as ordered;  No evidence of bony mets;  Gamma migrating paraprotein identified but there is no globular load and minimal M spike . Likely not a candidate for parathyroidectomy but should have a scan outpatient per nephro.       Important Medication Changes and Reason:  Decreased rosuvastatin to 10mg qd for stroke prevention     Active or Pending Issues Requiring Follow-up:  Follow up with nephrology in 1 week for continued monitoring of renal function and PCP Dr Manuel     Advanced Directives:   [x] Full code  [ ] DNR  [ ] Hospice    Discharge Diagnoses:  Generalized weakness  Hypercalcemia  Stage 5 chronic kidney disease not on chronic dialysis   HTN

## 2023-09-06 NOTE — DISCHARGE NOTE PROVIDER - PROVIDER TOKENS
PROVIDER:[TOKEN:[[id number]],FOLLOWUP:[1 week]] PROVIDER:[TOKEN:[[id number]],FOLLOWUP:[1 week]],FREE:[LAST:[Nephrologist Dr. Hinson],PHONE:[(   )    -],FAX:[(   )    -],FOLLOWUP:[1 week]]

## 2023-09-06 NOTE — PROGRESS NOTE ADULT - PROBLEM SELECTOR PROBLEM 3
Stage 5 chronic kidney disease not on chronic dialysis

## 2023-09-06 NOTE — DISCHARGE NOTE PROVIDER - NSDCMRMEDTOKEN_GEN_ALL_CORE_FT
1 rolling walker  - as directed  / height 157.5 cm / weight 49.90 kg: As directed  atenolol 25 mg oral tablet: 1 orally once a day  cinacalcet 60 mg oral tablet: 1 orally 3 times a day  NIFEdipine 30 mg oral tablet, extended release: 1 tab(s) orally once a day  rosuvastatin 10 mg oral capsule: 1 cap(s) orally once a day (at bedtime)  sodium bicarbonate 650 mg oral tablet: 2 orally 2 times a day

## 2023-09-06 NOTE — PROGRESS NOTE ADULT - PROBLEM SELECTOR PLAN 4
- continue amlodipine, atenolol
continue Procardia XL 30 po qd and atenolol
continue amlodipine, atenolol
continue amlodipine, atenolol

## 2023-09-06 NOTE — PROGRESS NOTE ADULT - ASSESSMENT
73-year-old female  with hypertension, CKD V, hyperparathyroidism, anemia, NHL status post chemo in remission, h/o beast cancer s/p b/l mastectomy, cerebral aneurysm, lumbar raidulopathy, prior stroke 2018 no residual  presents for generalized weakness for the past several days. Son reports patient has been more forgetful and confused over the past three months .  over the past week , patient has decreased appetite and decreased oral intake. She has nausea or vomiting.   MRA 2020 5x4.5x5.9 ACOM aneurysm noted   MRI L spine in 2021 as above   TSH WNL     o/e AAOx2, AUSTIN uppers> lowers   Impression:   1) generalized weakness, possible multifactorial. maybe related to JOVON on CKD related to dehydration.    2) chronic stroke per report, no deficits   3) cerebral aneurysm, ACOM  4) Lumbar Radiculopathy     - on high dose statin therapy, consider lowering to 40mg QHS  - agree with remeron for appetite stimulation and mood   - no focal neuro findings at this time.  can consider CTH if change in neuro exam  - at some point, non urgent would consider MRA H/N to monitor ACOM aneurysm.  last imaging was 2020   - supportive care  - renal recs appreicatged   - PT/OT  - check FS, glucose control <180  - GI/DVT ppx  - Counseling on diet, exercise, and medication adherence was done  - Counseling on smoking cessation and alcohol consumption offered when appropriate.  - Pain assessed and judicious use of narcotics when appropriate was discussed.    - Stroke education given when appropriate.  - Importance of fall prevention discussed.   - Differential diagnosis and plan of care discussed with patient and/or family and primary team  - Thank you for allowing me to participate in the care of this patient. Call with questions.   Ahsan Pace MD  Vascular Neurology  Office: [phone] 
 73F w/ HTN, past NHL, past breast CA, and CKD4-5, 9/2/23 p/w generalized weakness and hypercalcemia    (1)CKD - stage 4-5 - unclear exact etiology  (2)JOVON - prerenal - improved relative to admission - the creatinine in the mid-3s is likely her baseline  (3)Hypercalcemia - primary hyperparathyroidism or secondary  - suboptimal response to date to Cincalcet -   Hypomagnesemia sp IV mg++ this am    (4)Metabolic acidosis - renally mediated - acceptable for now, on PO NaHCO3      RECOMMEND:  (1)SP aredia and the calcium is normalizing   (2)Continue Cinacalcet as ordered;  No evidence of bony mets;  Gamma migrating paraprotein identified but there is no globular load and minimal M spike   (3)Honestly too old for parathyroidectomy but should have a scan     If the BP remains elevated then add Hydralazine 25mg po tid   No renal objection to dc and outpt follow up with her primary nephrologist Dr Hinson     No renal o    [facility]   [phone]   
 73F w/ hypertension, CKD V, hyperparathyroidism, anemia, NHL s/p chemo in remission, h/o beast cancer s/p b/l mastectomy   presents for generalized weakness for the past several days. 
 73F w/ HTN, past NHL, past breast CA, and CKD4-5, 9/2/23 p/w generalized weakness and hypercalcemia    (1)CKD - stage 4-5 - unclear exact etiology  (2)JOVON - prerenal - improved relative to admission - the creatinine in the mid-3s is likely her baseline  (3)Hypercalcemia - primary hyperparathyroidism or secondary  - suboptimal response to date to Cincalcet -   (4)Metabolic acidosis - renally mediated - acceptable for now, on PO NaHCO3      RECOMMEND:  (1)D/C IVF;  Zometa now to help control the calcium levels   (2)Continue Cinacalcet as ordered;  No evidence of bony mets;  Gamma migrating paraprotein identified but there is no globular load and minimal M spike   (3)Honestly too old for parathyroidectomy but should have a scan     If the BP remains elevated then add Hydralazine 25mg po tid     [facility]   [phone]   
 73F w/ hypertension, CKD V, hyperparathyroidism, anemia, NHL s/p chemo in remission, h/o beast cancer s/p b/l mastectomy   presents for generalized weakness for the past several days.

## 2023-09-06 NOTE — DISCHARGE NOTE PROVIDER - NSDCFUSCHEDAPPT_GEN_ALL_CORE_FT
[facility]  MRI  [facility]  Scheduled Appointment: 09/16/2023    [facility]  MRI  [facility]  Scheduled Appointment: 09/16/2023    Shun Sandoval  [facility]  NEUROLOGY [address] [facility]  Scheduled Appointment: 09/19/2023

## 2023-09-07 NOTE — CHART NOTE - NSCHARTNOTEFT_GEN_A_CORE
Request from Dr. Jimenes to facilitate patient discharge. Medication reconciliation reviewed, revised, and resolved with Dr. Jimenes who had medically cleared patient for discharge with follow-up as advised. Please refer to discharge note for detailed hospital course. Patient is currently stable for discharge to home with home PT at this time.    Salima Moreira PA-C  Dept of Medicine   Contact #[phone]
Left (1) message(s) for patient in regards to follow up care with callback information.
RD met with patient's daughter at bedside per request for discharge diet education. Discussed liberalizing diet while appetite and PO intake improve and to follow renal restrictions when PO intake is optimal. Provided pt's daughter with CKD nutrition therapy handout and Low Potassium nutrition therapy handout. Daughter at bedside very appreciative. RD to remain available.    Corinne Lima, Registered Dietitian

## 2023-09-08 LAB — PTH RELATED PROT SERPL-MCNC: <2 PMOL/L — SIGNIFICANT CHANGE UP

## 2023-09-16 ENCOUNTER — APPOINTMENT (OUTPATIENT)
Dept: MRI IMAGING | Facility: IMAGING CENTER | Age: 73
End: 2023-09-16

## 2023-09-19 ENCOUNTER — APPOINTMENT (OUTPATIENT)
Dept: NEUROLOGY | Facility: CLINIC | Age: 73
End: 2023-09-19

## 2023-10-22 ENCOUNTER — EMERGENCY (EMERGENCY)
Facility: HOSPITAL | Age: 73
LOS: 1 days | Discharge: HOME | End: 2023-10-22
Attending: PERSONAL EMERGENCY RESPONSE ATTENDANT
Payer: MEDICARE

## 2023-10-22 VITALS
DIASTOLIC BLOOD PRESSURE: 86 MMHG | HEIGHT: 62 IN | RESPIRATION RATE: 18 BRPM | OXYGEN SATURATION: 95 % | TEMPERATURE: 98 F | SYSTOLIC BLOOD PRESSURE: 144 MMHG | WEIGHT: 117.07 LBS | HEART RATE: 96 BPM

## 2023-10-22 DIAGNOSIS — Z98.890 OTHER SPECIFIED POSTPROCEDURAL STATES: Chronic | ICD-10-CM

## 2023-10-22 LAB
ALBUMIN SERPL ELPH-MCNC: 4 G/DL — SIGNIFICANT CHANGE UP (ref 3.3–5)
ALBUMIN SERPL ELPH-MCNC: 4 G/DL — SIGNIFICANT CHANGE UP (ref 3.3–5)
ALP SERPL-CCNC: 108 U/L — SIGNIFICANT CHANGE UP (ref 40–120)
ALP SERPL-CCNC: 108 U/L — SIGNIFICANT CHANGE UP (ref 40–120)
ALT FLD-CCNC: 32 U/L — SIGNIFICANT CHANGE UP (ref 10–45)
ALT FLD-CCNC: 32 U/L — SIGNIFICANT CHANGE UP (ref 10–45)
ANION GAP SERPL CALC-SCNC: 12 MMOL/L — SIGNIFICANT CHANGE UP (ref 5–17)
ANION GAP SERPL CALC-SCNC: 12 MMOL/L — SIGNIFICANT CHANGE UP (ref 5–17)
ANION GAP SERPL CALC-SCNC: 14 MMOL/L — SIGNIFICANT CHANGE UP (ref 5–17)
ANION GAP SERPL CALC-SCNC: 14 MMOL/L — SIGNIFICANT CHANGE UP (ref 5–17)
AST SERPL-CCNC: 25 U/L — SIGNIFICANT CHANGE UP (ref 10–40)
AST SERPL-CCNC: 25 U/L — SIGNIFICANT CHANGE UP (ref 10–40)
BASE EXCESS BLDV CALC-SCNC: -1.7 MMOL/L — SIGNIFICANT CHANGE UP (ref -2–3)
BASE EXCESS BLDV CALC-SCNC: -1.7 MMOL/L — SIGNIFICANT CHANGE UP (ref -2–3)
BASOPHILS # BLD AUTO: 0.08 K/UL — SIGNIFICANT CHANGE UP (ref 0–0.2)
BASOPHILS # BLD AUTO: 0.08 K/UL — SIGNIFICANT CHANGE UP (ref 0–0.2)
BASOPHILS NFR BLD AUTO: 1.8 % — SIGNIFICANT CHANGE UP (ref 0–2)
BASOPHILS NFR BLD AUTO: 1.8 % — SIGNIFICANT CHANGE UP (ref 0–2)
BILIRUB SERPL-MCNC: 0.2 MG/DL — SIGNIFICANT CHANGE UP (ref 0.2–1.2)
BILIRUB SERPL-MCNC: 0.2 MG/DL — SIGNIFICANT CHANGE UP (ref 0.2–1.2)
BUN SERPL-MCNC: 53 MG/DL — HIGH (ref 7–23)
CA-I SERPL-SCNC: 1.42 MMOL/L — HIGH (ref 1.15–1.33)
CA-I SERPL-SCNC: 1.42 MMOL/L — HIGH (ref 1.15–1.33)
CALCIUM SERPL-MCNC: 11.1 MG/DL — HIGH (ref 8.4–10.5)
CALCIUM SERPL-MCNC: 11.1 MG/DL — HIGH (ref 8.4–10.5)
CALCIUM SERPL-MCNC: 11.6 MG/DL — HIGH (ref 8.4–10.5)
CALCIUM SERPL-MCNC: 11.6 MG/DL — HIGH (ref 8.4–10.5)
CHLORIDE BLDV-SCNC: 104 MMOL/L — SIGNIFICANT CHANGE UP (ref 96–108)
CHLORIDE BLDV-SCNC: 104 MMOL/L — SIGNIFICANT CHANGE UP (ref 96–108)
CHLORIDE SERPL-SCNC: 105 MMOL/L — SIGNIFICANT CHANGE UP (ref 96–108)
CHLORIDE SERPL-SCNC: 105 MMOL/L — SIGNIFICANT CHANGE UP (ref 96–108)
CHLORIDE SERPL-SCNC: 106 MMOL/L — SIGNIFICANT CHANGE UP (ref 96–108)
CHLORIDE SERPL-SCNC: 106 MMOL/L — SIGNIFICANT CHANGE UP (ref 96–108)
CO2 BLDV-SCNC: 25 MMOL/L — SIGNIFICANT CHANGE UP (ref 22–26)
CO2 BLDV-SCNC: 25 MMOL/L — SIGNIFICANT CHANGE UP (ref 22–26)
CO2 SERPL-SCNC: 21 MMOL/L — LOW (ref 22–31)
CREAT SERPL-MCNC: 3.51 MG/DL — HIGH (ref 0.5–1.3)
CREAT SERPL-MCNC: 3.51 MG/DL — HIGH (ref 0.5–1.3)
CREAT SERPL-MCNC: 3.63 MG/DL — HIGH (ref 0.5–1.3)
CREAT SERPL-MCNC: 3.63 MG/DL — HIGH (ref 0.5–1.3)
EGFR: 13 ML/MIN/1.73M2 — LOW
EOSINOPHIL # BLD AUTO: 0.18 K/UL — SIGNIFICANT CHANGE UP (ref 0–0.5)
EOSINOPHIL # BLD AUTO: 0.18 K/UL — SIGNIFICANT CHANGE UP (ref 0–0.5)
EOSINOPHIL NFR BLD AUTO: 4 % — SIGNIFICANT CHANGE UP (ref 0–6)
EOSINOPHIL NFR BLD AUTO: 4 % — SIGNIFICANT CHANGE UP (ref 0–6)
GAS PNL BLDV: 132 MMOL/L — LOW (ref 136–145)
GAS PNL BLDV: 132 MMOL/L — LOW (ref 136–145)
GAS PNL BLDV: SIGNIFICANT CHANGE UP
GLUCOSE BLDV-MCNC: 110 MG/DL — HIGH (ref 70–99)
GLUCOSE BLDV-MCNC: 110 MG/DL — HIGH (ref 70–99)
GLUCOSE SERPL-MCNC: 108 MG/DL — HIGH (ref 70–99)
GLUCOSE SERPL-MCNC: 108 MG/DL — HIGH (ref 70–99)
GLUCOSE SERPL-MCNC: 111 MG/DL — HIGH (ref 70–99)
GLUCOSE SERPL-MCNC: 111 MG/DL — HIGH (ref 70–99)
HCO3 BLDV-SCNC: 24 MMOL/L — SIGNIFICANT CHANGE UP (ref 22–29)
HCO3 BLDV-SCNC: 24 MMOL/L — SIGNIFICANT CHANGE UP (ref 22–29)
HCT VFR BLD CALC: 27.5 % — LOW (ref 34.5–45)
HCT VFR BLD CALC: 27.5 % — LOW (ref 34.5–45)
HCT VFR BLDA CALC: 26 % — LOW (ref 34.5–46.5)
HCT VFR BLDA CALC: 26 % — LOW (ref 34.5–46.5)
HGB BLD CALC-MCNC: 8.5 G/DL — LOW (ref 11.7–16.1)
HGB BLD CALC-MCNC: 8.5 G/DL — LOW (ref 11.7–16.1)
HGB BLD-MCNC: 9.3 G/DL — LOW (ref 11.5–15.5)
HGB BLD-MCNC: 9.3 G/DL — LOW (ref 11.5–15.5)
IMM GRANULOCYTES NFR BLD AUTO: 0.7 % — SIGNIFICANT CHANGE UP (ref 0–0.9)
IMM GRANULOCYTES NFR BLD AUTO: 0.7 % — SIGNIFICANT CHANGE UP (ref 0–0.9)
LACTATE BLDV-MCNC: 2 MMOL/L — SIGNIFICANT CHANGE UP (ref 0.5–2)
LACTATE BLDV-MCNC: 2 MMOL/L — SIGNIFICANT CHANGE UP (ref 0.5–2)
LIDOCAIN IGE QN: 92 U/L — HIGH (ref 7–60)
LIDOCAIN IGE QN: 92 U/L — HIGH (ref 7–60)
LYMPHOCYTES # BLD AUTO: 0.71 K/UL — LOW (ref 1–3.3)
LYMPHOCYTES # BLD AUTO: 0.71 K/UL — LOW (ref 1–3.3)
LYMPHOCYTES # BLD AUTO: 15.6 % — SIGNIFICANT CHANGE UP (ref 13–44)
LYMPHOCYTES # BLD AUTO: 15.6 % — SIGNIFICANT CHANGE UP (ref 13–44)
MAGNESIUM SERPL-MCNC: 1.7 MG/DL — SIGNIFICANT CHANGE UP (ref 1.6–2.6)
MAGNESIUM SERPL-MCNC: 1.7 MG/DL — SIGNIFICANT CHANGE UP (ref 1.6–2.6)
MCHC RBC-ENTMCNC: 28.4 PG — SIGNIFICANT CHANGE UP (ref 27–34)
MCHC RBC-ENTMCNC: 28.4 PG — SIGNIFICANT CHANGE UP (ref 27–34)
MCHC RBC-ENTMCNC: 33.8 GM/DL — SIGNIFICANT CHANGE UP (ref 32–36)
MCHC RBC-ENTMCNC: 33.8 GM/DL — SIGNIFICANT CHANGE UP (ref 32–36)
MCV RBC AUTO: 84.1 FL — SIGNIFICANT CHANGE UP (ref 80–100)
MCV RBC AUTO: 84.1 FL — SIGNIFICANT CHANGE UP (ref 80–100)
MONOCYTES # BLD AUTO: 0.53 K/UL — SIGNIFICANT CHANGE UP (ref 0–0.9)
MONOCYTES # BLD AUTO: 0.53 K/UL — SIGNIFICANT CHANGE UP (ref 0–0.9)
MONOCYTES NFR BLD AUTO: 11.7 % — SIGNIFICANT CHANGE UP (ref 2–14)
MONOCYTES NFR BLD AUTO: 11.7 % — SIGNIFICANT CHANGE UP (ref 2–14)
NEUTROPHILS # BLD AUTO: 3.01 K/UL — SIGNIFICANT CHANGE UP (ref 1.8–7.4)
NEUTROPHILS # BLD AUTO: 3.01 K/UL — SIGNIFICANT CHANGE UP (ref 1.8–7.4)
NEUTROPHILS NFR BLD AUTO: 66.2 % — SIGNIFICANT CHANGE UP (ref 43–77)
NEUTROPHILS NFR BLD AUTO: 66.2 % — SIGNIFICANT CHANGE UP (ref 43–77)
NRBC # BLD: 0 /100 WBCS — SIGNIFICANT CHANGE UP (ref 0–0)
NRBC # BLD: 0 /100 WBCS — SIGNIFICANT CHANGE UP (ref 0–0)
PCO2 BLDV: 42 MMHG — SIGNIFICANT CHANGE UP (ref 39–42)
PCO2 BLDV: 42 MMHG — SIGNIFICANT CHANGE UP (ref 39–42)
PH BLDV: 7.36 — SIGNIFICANT CHANGE UP (ref 7.32–7.43)
PH BLDV: 7.36 — SIGNIFICANT CHANGE UP (ref 7.32–7.43)
PLATELET # BLD AUTO: 208 K/UL — SIGNIFICANT CHANGE UP (ref 150–400)
PLATELET # BLD AUTO: 208 K/UL — SIGNIFICANT CHANGE UP (ref 150–400)
PO2 BLDV: 27 MMHG — SIGNIFICANT CHANGE UP (ref 25–45)
PO2 BLDV: 27 MMHG — SIGNIFICANT CHANGE UP (ref 25–45)
POTASSIUM BLDV-SCNC: 4.9 MMOL/L — SIGNIFICANT CHANGE UP (ref 3.5–5.1)
POTASSIUM BLDV-SCNC: 4.9 MMOL/L — SIGNIFICANT CHANGE UP (ref 3.5–5.1)
POTASSIUM SERPL-MCNC: 4.5 MMOL/L — SIGNIFICANT CHANGE UP (ref 3.5–5.3)
POTASSIUM SERPL-MCNC: 4.5 MMOL/L — SIGNIFICANT CHANGE UP (ref 3.5–5.3)
POTASSIUM SERPL-MCNC: 4.6 MMOL/L — SIGNIFICANT CHANGE UP (ref 3.5–5.3)
POTASSIUM SERPL-MCNC: 4.6 MMOL/L — SIGNIFICANT CHANGE UP (ref 3.5–5.3)
POTASSIUM SERPL-SCNC: 4.5 MMOL/L — SIGNIFICANT CHANGE UP (ref 3.5–5.3)
POTASSIUM SERPL-SCNC: 4.5 MMOL/L — SIGNIFICANT CHANGE UP (ref 3.5–5.3)
POTASSIUM SERPL-SCNC: 4.6 MMOL/L — SIGNIFICANT CHANGE UP (ref 3.5–5.3)
POTASSIUM SERPL-SCNC: 4.6 MMOL/L — SIGNIFICANT CHANGE UP (ref 3.5–5.3)
PROT SERPL-MCNC: 6.8 G/DL — SIGNIFICANT CHANGE UP (ref 6–8.3)
PROT SERPL-MCNC: 6.8 G/DL — SIGNIFICANT CHANGE UP (ref 6–8.3)
RBC # BLD: 3.27 M/UL — LOW (ref 3.8–5.2)
RBC # BLD: 3.27 M/UL — LOW (ref 3.8–5.2)
RBC # FLD: 14.9 % — HIGH (ref 10.3–14.5)
RBC # FLD: 14.9 % — HIGH (ref 10.3–14.5)
SAO2 % BLDV: 61.3 % — LOW (ref 67–88)
SAO2 % BLDV: 61.3 % — LOW (ref 67–88)
SODIUM SERPL-SCNC: 138 MMOL/L — SIGNIFICANT CHANGE UP (ref 135–145)
SODIUM SERPL-SCNC: 138 MMOL/L — SIGNIFICANT CHANGE UP (ref 135–145)
SODIUM SERPL-SCNC: 141 MMOL/L — SIGNIFICANT CHANGE UP (ref 135–145)
SODIUM SERPL-SCNC: 141 MMOL/L — SIGNIFICANT CHANGE UP (ref 135–145)
TROPONIN T, HIGH SENSITIVITY RESULT: 50 NG/L — SIGNIFICANT CHANGE UP (ref 0–51)
TROPONIN T, HIGH SENSITIVITY RESULT: 50 NG/L — SIGNIFICANT CHANGE UP (ref 0–51)
WBC # BLD: 4.54 K/UL — SIGNIFICANT CHANGE UP (ref 3.8–10.5)
WBC # BLD: 4.54 K/UL — SIGNIFICANT CHANGE UP (ref 3.8–10.5)
WBC # FLD AUTO: 4.54 K/UL — SIGNIFICANT CHANGE UP (ref 3.8–10.5)
WBC # FLD AUTO: 4.54 K/UL — SIGNIFICANT CHANGE UP (ref 3.8–10.5)

## 2023-10-22 PROCEDURE — 71045 X-RAY EXAM CHEST 1 VIEW: CPT | Mod: 26

## 2023-10-22 PROCEDURE — 99285 EMERGENCY DEPT VISIT HI MDM: CPT

## 2023-10-22 RX ORDER — SODIUM CHLORIDE 9 MG/ML
500 INJECTION INTRAMUSCULAR; INTRAVENOUS; SUBCUTANEOUS ONCE
Refills: 0 | Status: COMPLETED | OUTPATIENT
Start: 2023-10-22 | End: 2023-10-22

## 2023-10-22 RX ADMIN — SODIUM CHLORIDE 500 MILLILITER(S): 9 INJECTION INTRAMUSCULAR; INTRAVENOUS; SUBCUTANEOUS at 21:58

## 2023-10-22 NOTE — ED PROVIDER NOTE - OBJECTIVE STATEMENT
Patient is a 73 year-old-female with history of CVA, osteoarthritis, CKD, lymphoma, HTN and hyperparathyroidism presents with pre-syncope. Accompanied by son. Reports that she was sitting at the kitchen table, felt weak and had some abdominal cramping and then slipped down on the chair; per son, she called out to the aide and the aide helped lower her to the ground but reports that she was awake and did not pass out. Denies head trauma. Denies recent fever, vomiting, chest pain, shortness of breath, urinary or bowel complaints.

## 2023-10-22 NOTE — ED ADULT NURSE NOTE - SUICIDE SCREENING QUESTION 2
Received faxed BG readings from [facility]. Lantus 10 units started 21.    BG readings since Lantus start: [id number] and post-supper: [phone].    Plan (faxed to Yazmin Jones at [facility])  Increase Lantus to 15 units daily.  Start NovoLOG 4 units prior to supper  Test BG 4 times daily: fasting, pre-lunch, pre-supper and 8 pm  Fax readings 21 and weekly.  
No

## 2023-10-22 NOTE — ED ADULT NURSE NOTE - NSFALLRISKINTERV_ED_ALL_ED
Assistance OOB with selected safe patient handling equipment if applicable/Communicate fall risk and risk factors to all staff, patient, and family/Monitor gait and stability/Orthostatic vital signs/Provide patient with walking aids/Provide visual cue: yellow wristband, yellow gown, etc/Reinforce activity limits and safety measures with patient and family/Call bell, personal items and telephone in reach/Instruct patient to call for assistance before getting out of bed/chair/stretcher/Non-slip footwear applied when patient is off stretcher/[place] to call system/Physically safe environment - no spills, clutter or unnecessary equipment/Purposeful Proactive Rounding/Room/bathroom lighting operational, light cord in reach

## 2023-10-22 NOTE — ED PROVIDER NOTE - CLINICAL SUMMARY MEDICAL DECISION MAKING FREE TEXT BOX
Attending MD Chowdhury.  Agree with above.  Pt is a 72 yo fem with pmhx of stroke, osteoarthritis, CVA, CKD, lymphoma, gout, HTN, hyperparathyroidism who presents to Ed with recurrent syncopal/near-syncopal event this evening while seated at the dinner table.  Pt was seated and felt onset of abdominal pain assoc with light-headedness, near-syncope.  Pt was able to call for assistance from 's aid and was lowered to the floor without falling/striking head.  No full LOC but transient pre-syncope.  Pt lowered to ground and abdominal pain resolved, no assoc back pain.  Pt had similar sxs previously ~1 mo ago during which time pt was found to be hypercalcemic with JOVON on CKD.  Pt in Ed with resolved sxs and well appearing with NAD.  On EKG, Pt without actionable findings today.  Labs c/w recurrent mild hypercalcemia.  Planned hydration, reassessment and shared decision making but suspect need for recurrent admission.

## 2023-10-22 NOTE — ED PROVIDER NOTE - PROGRESS NOTE DETAILS
Attending MD Chowdhury.  Of note, pt's lipase elevated and pt endorsing mild epigastric pain with more severe epigastric pain preceding arrival.  Isolated finding on labs c/w mild hypercalcemia more likely 2/2 parathyroid derangement.  Abdomen soft, not impressively tender on arrival.  CTAP ordered 2/2 lab abnormalities and desire to r/o pancreatic pathology.  If CTAP non-actionable, planned reassessment of calcium and shared decision making.  Pt signed out to incoming team in stable condition pending above. [place] PGY3  Reassess patient and patient reports feeling better but feeling a little dehydrated. Per son at bedside, patient missed her medication for hypercalcemia yesterday. Discussed my concerns with the patient and family and they voiced understanding but they would like to take the patient home if possible. Will give another 500c fluid bolus and obtain UA to r/o UTI and reassess. [place] PGY3  UA concerning for UTI. Discussed with family and patient - they would like to go home as UTI is likely contributing to her weakness and dehydration. Return precautions reviewed including but not limited to chest pain, dizziness, syncope, worsening weakness, shortness of breath, abdominal pain. Prescription sent to patient's pharmacy. Recommend PMD follow up. Attending MD Chowdhury.  Of note, pt's lipase elevated and pt endorsing mild epigastric pain with more severe epigastric pain preceding arrival.  Isolated finding on labs c/w mild hypercalcemia more likely 2/2 parathyroid derangement.  Abdomen soft, not impressively tender on arrival.  CTAP ordered 2/2 lab abnormalities and desire to r/o pancreatic pathology.  If CTAP non-actionable, planned reassessment of calcium and shared decision making.  Pt signed out to incoming team in stable condition pending above..

## 2023-10-22 NOTE — ED PROVIDER NOTE - PATIENT PORTAL LINK FT
You can access the FollowMyHealth Patient Portal offered by [facility] by registering at the following website: [email or web address]. By joining [employer]’s FollowMyHealth portal, you will also be able to view your health information using other applications (apps) compatible with our system.

## 2023-10-22 NOTE — ED PROVIDER NOTE - PHYSICAL EXAMINATION
Vitals: I have reviewed the patients vital signs  General: Well dressed, well appearing, no acute distress  HEENT: Atraumatic, normocephalic, airway patent  Eyes: EOMI, tracking appropriately  Neck: no tracheal deviation, no JVD  Chest/Lungs: symmetric chest rise, speaking in complete sentences, no WOB  Heart: skin and extremities well perfused, regular rate and rhythm  Abdomen: soft, nontender and nondistended   Neuro: A+Ox3, ambulating without difficulty, CN grossly intact  MSK: strength at baseline in all extremities, no muscle wasting or atrophy  Skin: no cyanosis, no jaundice, no new emergent lesions

## 2023-10-22 NOTE — ED ADULT NURSE NOTE - NSFALLASSESSNEED_ED_ALL_ED
I have refilled your cardiac medications for a year.    We will stop the furosemide and potassium, watch for any signs of fluid retention (weight gain >2 lbs overnight, leg swelling, and/or worsening shortness of breath).    We will stop the jardiance.    Increase your rosuvastatin to 10 mg once in the evening.    Repeat labs in 6 months.    Follow up with Dr. Glover regarding coumadin in 3 months.     We will see you back in 6 months to review your heart medications and further plan of care.       yes

## 2023-10-22 NOTE — ED PROVIDER NOTE - NSFOLLOWUPINSTRUCTIONS_ED_ALL_ED_FT
You were seen in the emergency department for weakness and near syncope episode.   Your workup in the emergency department includes bloodwork, urine study and CT scan of abdomen and pelvis.   You can find the results of all the tests in this discharge packet.   Please follow up with your primary care doctor within 48 hours for continuation of care.     Return to the emergency department if you experience any new/concerning/worsening symptoms such as but not limited to: fever (>100.3F), intractable nausea, vomiting, chest pain, shortness of breath, abdominal pain.

## 2023-10-22 NOTE — ED PROVIDER NOTE - ATTENDING CONTRIBUTION TO CARE
Attending MD Chowdhury:  I performed a history and physical exam of the patient and discussed their management with the resident. I reviewed the resident's note and agree with the documented findings and plan of care. My medical decision making and observations are found above.

## 2023-10-22 NOTE — ED ADULT TRIAGE NOTE - NS ED NURSE DIRECT TO ROOM YN
After reviewing patient's chart, refill Lexapro 10 mg per protocol.     Last office visit: 9/24/2019   No future appointment schedule        No

## 2023-10-23 VITALS
RESPIRATION RATE: 17 BRPM | OXYGEN SATURATION: 100 % | SYSTOLIC BLOOD PRESSURE: 158 MMHG | DIASTOLIC BLOOD PRESSURE: 66 MMHG | TEMPERATURE: 98 F | HEART RATE: 57 BPM

## 2023-10-23 LAB
APPEARANCE UR: CLEAR — SIGNIFICANT CHANGE UP
APPEARANCE UR: CLEAR — SIGNIFICANT CHANGE UP
BACTERIA # UR AUTO: NEGATIVE — SIGNIFICANT CHANGE UP
BACTERIA # UR AUTO: NEGATIVE — SIGNIFICANT CHANGE UP
BILIRUB UR-MCNC: NEGATIVE — SIGNIFICANT CHANGE UP
BILIRUB UR-MCNC: NEGATIVE — SIGNIFICANT CHANGE UP
COLOR SPEC: SIGNIFICANT CHANGE UP
COLOR SPEC: SIGNIFICANT CHANGE UP
DIFF PNL FLD: NEGATIVE — SIGNIFICANT CHANGE UP
DIFF PNL FLD: NEGATIVE — SIGNIFICANT CHANGE UP
EPI CELLS # UR: 6 /HPF — HIGH
EPI CELLS # UR: 6 /HPF — HIGH
GLUCOSE UR QL: NEGATIVE — SIGNIFICANT CHANGE UP
GLUCOSE UR QL: NEGATIVE — SIGNIFICANT CHANGE UP
HYALINE CASTS # UR AUTO: 1 /LPF — SIGNIFICANT CHANGE UP (ref 0–2)
HYALINE CASTS # UR AUTO: 1 /LPF — SIGNIFICANT CHANGE UP (ref 0–2)
KETONES UR-MCNC: NEGATIVE — SIGNIFICANT CHANGE UP
KETONES UR-MCNC: NEGATIVE — SIGNIFICANT CHANGE UP
LEUKOCYTE ESTERASE UR-ACNC: ABNORMAL
LEUKOCYTE ESTERASE UR-ACNC: ABNORMAL
NITRITE UR-MCNC: NEGATIVE — SIGNIFICANT CHANGE UP
NITRITE UR-MCNC: NEGATIVE — SIGNIFICANT CHANGE UP
PH UR: 6.5 — SIGNIFICANT CHANGE UP (ref 5–8)
PH UR: 6.5 — SIGNIFICANT CHANGE UP (ref 5–8)
PROT UR-MCNC: ABNORMAL
PROT UR-MCNC: ABNORMAL
RBC CASTS # UR COMP ASSIST: 2 /HPF — SIGNIFICANT CHANGE UP (ref 0–4)
RBC CASTS # UR COMP ASSIST: 2 /HPF — SIGNIFICANT CHANGE UP (ref 0–4)
SP GR SPEC: 1.01 — SIGNIFICANT CHANGE UP (ref 1.01–1.02)
SP GR SPEC: 1.01 — SIGNIFICANT CHANGE UP (ref 1.01–1.02)
UROBILINOGEN FLD QL: NEGATIVE — SIGNIFICANT CHANGE UP
UROBILINOGEN FLD QL: NEGATIVE — SIGNIFICANT CHANGE UP
WBC UR QL: 39 /HPF — HIGH (ref 0–5)
WBC UR QL: 39 /HPF — HIGH (ref 0–5)

## 2023-10-23 PROCEDURE — 82435 ASSAY OF BLOOD CHLORIDE: CPT

## 2023-10-23 PROCEDURE — 84484 ASSAY OF TROPONIN QUANT: CPT

## 2023-10-23 PROCEDURE — 82803 BLOOD GASES ANY COMBINATION: CPT

## 2023-10-23 PROCEDURE — 71045 X-RAY EXAM CHEST 1 VIEW: CPT

## 2023-10-23 PROCEDURE — 87086 URINE CULTURE/COLONY COUNT: CPT

## 2023-10-23 PROCEDURE — 83605 ASSAY OF LACTIC ACID: CPT

## 2023-10-23 PROCEDURE — 85025 COMPLETE CBC W/AUTO DIFF WBC: CPT

## 2023-10-23 PROCEDURE — 84132 ASSAY OF SERUM POTASSIUM: CPT

## 2023-10-23 PROCEDURE — 99285 EMERGENCY DEPT VISIT HI MDM: CPT | Mod: 25

## 2023-10-23 PROCEDURE — 82947 ASSAY GLUCOSE BLOOD QUANT: CPT

## 2023-10-23 PROCEDURE — 85014 HEMATOCRIT: CPT

## 2023-10-23 PROCEDURE — 96374 THER/PROPH/DIAG INJ IV PUSH: CPT

## 2023-10-23 PROCEDURE — 80048 BASIC METABOLIC PNL TOTAL CA: CPT

## 2023-10-23 PROCEDURE — 83690 ASSAY OF LIPASE: CPT

## 2023-10-23 PROCEDURE — 81001 URINALYSIS AUTO W/SCOPE: CPT

## 2023-10-23 PROCEDURE — 84295 ASSAY OF SERUM SODIUM: CPT

## 2023-10-23 PROCEDURE — 85018 HEMOGLOBIN: CPT

## 2023-10-23 PROCEDURE — 82330 ASSAY OF CALCIUM: CPT

## 2023-10-23 PROCEDURE — 93005 ELECTROCARDIOGRAM TRACING: CPT

## 2023-10-23 PROCEDURE — 74176 CT ABD & PELVIS W/O CONTRAST: CPT | Mod: 26,MA

## 2023-10-23 PROCEDURE — 80053 COMPREHEN METABOLIC PANEL: CPT

## 2023-10-23 PROCEDURE — 83735 ASSAY OF MAGNESIUM: CPT

## 2023-10-23 PROCEDURE — 74176 CT ABD & PELVIS W/O CONTRAST: CPT | Mod: MA

## 2023-10-23 RX ORDER — CEFTRIAXONE 500 MG/1
1000 INJECTION, POWDER, FOR SOLUTION INTRAMUSCULAR; INTRAVENOUS ONCE
Refills: 0 | Status: COMPLETED | OUTPATIENT
Start: 2023-10-23 | End: 2023-10-23

## 2023-10-23 RX ORDER — SODIUM CHLORIDE 9 MG/ML
500 INJECTION INTRAMUSCULAR; INTRAVENOUS; SUBCUTANEOUS ONCE
Refills: 0 | Status: COMPLETED | OUTPATIENT
Start: 2023-10-23 | End: 2023-10-23

## 2023-10-23 RX ORDER — CEFPODOXIME PROXETIL 100 MG
1 TABLET ORAL
Qty: 14 | Refills: 0
Start: 2023-10-23 | End: 2023-10-29

## 2023-10-23 RX ADMIN — SODIUM CHLORIDE 500 MILLILITER(S): 9 INJECTION INTRAMUSCULAR; INTRAVENOUS; SUBCUTANEOUS at 02:46

## 2023-10-23 RX ADMIN — CEFTRIAXONE 100 MILLIGRAM(S): 500 INJECTION, POWDER, FOR SOLUTION INTRAMUSCULAR; INTRAVENOUS at 03:40

## 2023-10-24 LAB
CULTURE RESULTS: SIGNIFICANT CHANGE UP
CULTURE RESULTS: SIGNIFICANT CHANGE UP
SPECIMEN SOURCE: SIGNIFICANT CHANGE UP
SPECIMEN SOURCE: SIGNIFICANT CHANGE UP

## 2023-10-25 NOTE — ED POST DISCHARGE NOTE - DETAILS
10/25: spoke with patient son, will cont abx and advised should have rechecked. states visiting RN coming tomorrow so they will discuss with her and they are also in process of getting a follow up with PMD Dr. Carlos Pena PA-C

## 2023-12-04 ENCOUNTER — EMERGENCY (EMERGENCY)
Facility: HOSPITAL | Age: 73
LOS: 1 days | Discharge: HOME | End: 2023-12-04
Attending: STUDENT IN AN ORGANIZED HEALTH CARE EDUCATION/TRAINING PROGRAM | Admitting: STUDENT IN AN ORGANIZED HEALTH CARE EDUCATION/TRAINING PROGRAM
Payer: MEDICARE

## 2023-12-04 VITALS
RESPIRATION RATE: 16 BRPM | HEART RATE: 56 BPM | DIASTOLIC BLOOD PRESSURE: 82 MMHG | TEMPERATURE: 98 F | OXYGEN SATURATION: 100 % | HEIGHT: 62 IN | SYSTOLIC BLOOD PRESSURE: 175 MMHG

## 2023-12-04 DIAGNOSIS — Z98.890 OTHER SPECIFIED POSTPROCEDURAL STATES: Chronic | ICD-10-CM

## 2023-12-04 LAB
ALBUMIN SERPL ELPH-MCNC: 4.3 G/DL — SIGNIFICANT CHANGE UP (ref 3.3–5)
ALBUMIN SERPL ELPH-MCNC: 4.3 G/DL — SIGNIFICANT CHANGE UP (ref 3.3–5)
ALP SERPL-CCNC: 94 U/L — SIGNIFICANT CHANGE UP (ref 40–120)
ALP SERPL-CCNC: 94 U/L — SIGNIFICANT CHANGE UP (ref 40–120)
ALT FLD-CCNC: 81 U/L — HIGH (ref 4–33)
ALT FLD-CCNC: 81 U/L — HIGH (ref 4–33)
ANION GAP SERPL CALC-SCNC: 14 MMOL/L — SIGNIFICANT CHANGE UP (ref 7–14)
ANION GAP SERPL CALC-SCNC: 14 MMOL/L — SIGNIFICANT CHANGE UP (ref 7–14)
APPEARANCE UR: CLEAR — SIGNIFICANT CHANGE UP
APPEARANCE UR: CLEAR — SIGNIFICANT CHANGE UP
AST SERPL-CCNC: 45 U/L — HIGH (ref 4–32)
AST SERPL-CCNC: 45 U/L — HIGH (ref 4–32)
BACTERIA # UR AUTO: NEGATIVE /HPF — SIGNIFICANT CHANGE UP
BACTERIA # UR AUTO: NEGATIVE /HPF — SIGNIFICANT CHANGE UP
BASOPHILS # BLD AUTO: 0.07 K/UL — SIGNIFICANT CHANGE UP (ref 0–0.2)
BASOPHILS # BLD AUTO: 0.07 K/UL — SIGNIFICANT CHANGE UP (ref 0–0.2)
BASOPHILS NFR BLD AUTO: 1.8 % — SIGNIFICANT CHANGE UP (ref 0–2)
BASOPHILS NFR BLD AUTO: 1.8 % — SIGNIFICANT CHANGE UP (ref 0–2)
BILIRUB SERPL-MCNC: 0.3 MG/DL — SIGNIFICANT CHANGE UP (ref 0.2–1.2)
BILIRUB SERPL-MCNC: 0.3 MG/DL — SIGNIFICANT CHANGE UP (ref 0.2–1.2)
BILIRUB UR-MCNC: NEGATIVE — SIGNIFICANT CHANGE UP
BILIRUB UR-MCNC: NEGATIVE — SIGNIFICANT CHANGE UP
BUN SERPL-MCNC: 49 MG/DL — HIGH (ref 7–23)
BUN SERPL-MCNC: 49 MG/DL — HIGH (ref 7–23)
CALCIUM SERPL-MCNC: 12.9 MG/DL — HIGH (ref 8.4–10.5)
CALCIUM SERPL-MCNC: 12.9 MG/DL — HIGH (ref 8.4–10.5)
CAST: 0 /LPF — SIGNIFICANT CHANGE UP (ref 0–4)
CAST: 0 /LPF — SIGNIFICANT CHANGE UP (ref 0–4)
CHLORIDE SERPL-SCNC: 109 MMOL/L — HIGH (ref 98–107)
CHLORIDE SERPL-SCNC: 109 MMOL/L — HIGH (ref 98–107)
CO2 SERPL-SCNC: 18 MMOL/L — LOW (ref 22–31)
CO2 SERPL-SCNC: 18 MMOL/L — LOW (ref 22–31)
COLOR SPEC: YELLOW — SIGNIFICANT CHANGE UP
COLOR SPEC: YELLOW — SIGNIFICANT CHANGE UP
CREAT SERPL-MCNC: 3.18 MG/DL — HIGH (ref 0.5–1.3)
CREAT SERPL-MCNC: 3.18 MG/DL — HIGH (ref 0.5–1.3)
DIFF PNL FLD: NEGATIVE — SIGNIFICANT CHANGE UP
DIFF PNL FLD: NEGATIVE — SIGNIFICANT CHANGE UP
EGFR: 15 ML/MIN/1.73M2 — LOW
EGFR: 15 ML/MIN/1.73M2 — LOW
EOSINOPHIL # BLD AUTO: 0.13 K/UL — SIGNIFICANT CHANGE UP (ref 0–0.5)
EOSINOPHIL # BLD AUTO: 0.13 K/UL — SIGNIFICANT CHANGE UP (ref 0–0.5)
EOSINOPHIL NFR BLD AUTO: 3.4 % — SIGNIFICANT CHANGE UP (ref 0–6)
EOSINOPHIL NFR BLD AUTO: 3.4 % — SIGNIFICANT CHANGE UP (ref 0–6)
GLUCOSE SERPL-MCNC: 80 MG/DL — SIGNIFICANT CHANGE UP (ref 70–99)
GLUCOSE SERPL-MCNC: 80 MG/DL — SIGNIFICANT CHANGE UP (ref 70–99)
GLUCOSE UR QL: NEGATIVE MG/DL — SIGNIFICANT CHANGE UP
GLUCOSE UR QL: NEGATIVE MG/DL — SIGNIFICANT CHANGE UP
HCT VFR BLD CALC: 28.3 % — LOW (ref 34.5–45)
HCT VFR BLD CALC: 28.3 % — LOW (ref 34.5–45)
HGB BLD-MCNC: 9.7 G/DL — LOW (ref 11.5–15.5)
HGB BLD-MCNC: 9.7 G/DL — LOW (ref 11.5–15.5)
IANC: 2.13 K/UL — SIGNIFICANT CHANGE UP (ref 1.8–7.4)
IANC: 2.13 K/UL — SIGNIFICANT CHANGE UP (ref 1.8–7.4)
IMM GRANULOCYTES NFR BLD AUTO: 0 % — SIGNIFICANT CHANGE UP (ref 0–0.9)
IMM GRANULOCYTES NFR BLD AUTO: 0 % — SIGNIFICANT CHANGE UP (ref 0–0.9)
KETONES UR-MCNC: NEGATIVE MG/DL — SIGNIFICANT CHANGE UP
KETONES UR-MCNC: NEGATIVE MG/DL — SIGNIFICANT CHANGE UP
LEUKOCYTE ESTERASE UR-ACNC: ABNORMAL
LEUKOCYTE ESTERASE UR-ACNC: ABNORMAL
LYMPHOCYTES # BLD AUTO: 1.22 K/UL — SIGNIFICANT CHANGE UP (ref 1–3.3)
LYMPHOCYTES # BLD AUTO: 1.22 K/UL — SIGNIFICANT CHANGE UP (ref 1–3.3)
LYMPHOCYTES # BLD AUTO: 31.8 % — SIGNIFICANT CHANGE UP (ref 13–44)
LYMPHOCYTES # BLD AUTO: 31.8 % — SIGNIFICANT CHANGE UP (ref 13–44)
MAGNESIUM SERPL-MCNC: 1.6 MG/DL — SIGNIFICANT CHANGE UP (ref 1.6–2.6)
MAGNESIUM SERPL-MCNC: 1.6 MG/DL — SIGNIFICANT CHANGE UP (ref 1.6–2.6)
MCHC RBC-ENTMCNC: 28.8 PG — SIGNIFICANT CHANGE UP (ref 27–34)
MCHC RBC-ENTMCNC: 28.8 PG — SIGNIFICANT CHANGE UP (ref 27–34)
MCHC RBC-ENTMCNC: 34.3 GM/DL — SIGNIFICANT CHANGE UP (ref 32–36)
MCHC RBC-ENTMCNC: 34.3 GM/DL — SIGNIFICANT CHANGE UP (ref 32–36)
MCV RBC AUTO: 84 FL — SIGNIFICANT CHANGE UP (ref 80–100)
MCV RBC AUTO: 84 FL — SIGNIFICANT CHANGE UP (ref 80–100)
MONOCYTES # BLD AUTO: 0.29 K/UL — SIGNIFICANT CHANGE UP (ref 0–0.9)
MONOCYTES # BLD AUTO: 0.29 K/UL — SIGNIFICANT CHANGE UP (ref 0–0.9)
MONOCYTES NFR BLD AUTO: 7.6 % — SIGNIFICANT CHANGE UP (ref 2–14)
MONOCYTES NFR BLD AUTO: 7.6 % — SIGNIFICANT CHANGE UP (ref 2–14)
NEUTROPHILS # BLD AUTO: 2.13 K/UL — SIGNIFICANT CHANGE UP (ref 1.8–7.4)
NEUTROPHILS # BLD AUTO: 2.13 K/UL — SIGNIFICANT CHANGE UP (ref 1.8–7.4)
NEUTROPHILS NFR BLD AUTO: 55.4 % — SIGNIFICANT CHANGE UP (ref 43–77)
NEUTROPHILS NFR BLD AUTO: 55.4 % — SIGNIFICANT CHANGE UP (ref 43–77)
NITRITE UR-MCNC: NEGATIVE — SIGNIFICANT CHANGE UP
NITRITE UR-MCNC: NEGATIVE — SIGNIFICANT CHANGE UP
NRBC # BLD: 0 /100 WBCS — SIGNIFICANT CHANGE UP (ref 0–0)
NRBC # BLD: 0 /100 WBCS — SIGNIFICANT CHANGE UP (ref 0–0)
NRBC # FLD: 0 K/UL — SIGNIFICANT CHANGE UP (ref 0–0)
NRBC # FLD: 0 K/UL — SIGNIFICANT CHANGE UP (ref 0–0)
NT-PROBNP SERPL-SCNC: 728 PG/ML — HIGH
NT-PROBNP SERPL-SCNC: 728 PG/ML — HIGH
PH UR: 6.5 — SIGNIFICANT CHANGE UP (ref 5–8)
PH UR: 6.5 — SIGNIFICANT CHANGE UP (ref 5–8)
PHOSPHATE SERPL-MCNC: 3.4 MG/DL — SIGNIFICANT CHANGE UP (ref 2.5–4.5)
PHOSPHATE SERPL-MCNC: 3.4 MG/DL — SIGNIFICANT CHANGE UP (ref 2.5–4.5)
PLATELET # BLD AUTO: 195 K/UL — SIGNIFICANT CHANGE UP (ref 150–400)
PLATELET # BLD AUTO: 195 K/UL — SIGNIFICANT CHANGE UP (ref 150–400)
POTASSIUM SERPL-MCNC: 4.4 MMOL/L — SIGNIFICANT CHANGE UP (ref 3.5–5.3)
POTASSIUM SERPL-MCNC: 4.4 MMOL/L — SIGNIFICANT CHANGE UP (ref 3.5–5.3)
POTASSIUM SERPL-SCNC: 4.4 MMOL/L — SIGNIFICANT CHANGE UP (ref 3.5–5.3)
POTASSIUM SERPL-SCNC: 4.4 MMOL/L — SIGNIFICANT CHANGE UP (ref 3.5–5.3)
PROT SERPL-MCNC: 6.8 G/DL — SIGNIFICANT CHANGE UP (ref 6–8.3)
PROT SERPL-MCNC: 6.8 G/DL — SIGNIFICANT CHANGE UP (ref 6–8.3)
PROT UR-MCNC: 30 MG/DL
PROT UR-MCNC: 30 MG/DL
RBC # BLD: 3.37 M/UL — LOW (ref 3.8–5.2)
RBC # BLD: 3.37 M/UL — LOW (ref 3.8–5.2)
RBC # FLD: 16 % — HIGH (ref 10.3–14.5)
RBC # FLD: 16 % — HIGH (ref 10.3–14.5)
RBC CASTS # UR COMP ASSIST: 0 /HPF — SIGNIFICANT CHANGE UP (ref 0–4)
RBC CASTS # UR COMP ASSIST: 0 /HPF — SIGNIFICANT CHANGE UP (ref 0–4)
SODIUM SERPL-SCNC: 141 MMOL/L — SIGNIFICANT CHANGE UP (ref 135–145)
SODIUM SERPL-SCNC: 141 MMOL/L — SIGNIFICANT CHANGE UP (ref 135–145)
SP GR SPEC: 1.01 — SIGNIFICANT CHANGE UP (ref 1–1.03)
SP GR SPEC: 1.01 — SIGNIFICANT CHANGE UP (ref 1–1.03)
SQUAMOUS # UR AUTO: 1 /HPF — SIGNIFICANT CHANGE UP (ref 0–5)
SQUAMOUS # UR AUTO: 1 /HPF — SIGNIFICANT CHANGE UP (ref 0–5)
TROPONIN T, HIGH SENSITIVITY RESULT: 48 NG/L — SIGNIFICANT CHANGE UP
TROPONIN T, HIGH SENSITIVITY RESULT: 48 NG/L — SIGNIFICANT CHANGE UP
TROPONIN T, HIGH SENSITIVITY RESULT: 52 NG/L — HIGH
TROPONIN T, HIGH SENSITIVITY RESULT: 52 NG/L — HIGH
UROBILINOGEN FLD QL: 0.2 MG/DL — SIGNIFICANT CHANGE UP (ref 0.2–1)
UROBILINOGEN FLD QL: 0.2 MG/DL — SIGNIFICANT CHANGE UP (ref 0.2–1)
WBC # BLD: 3.84 K/UL — SIGNIFICANT CHANGE UP (ref 3.8–10.5)
WBC # BLD: 3.84 K/UL — SIGNIFICANT CHANGE UP (ref 3.8–10.5)
WBC # FLD AUTO: 3.84 K/UL — SIGNIFICANT CHANGE UP (ref 3.8–10.5)
WBC # FLD AUTO: 3.84 K/UL — SIGNIFICANT CHANGE UP (ref 3.8–10.5)
WBC UR QL: 2 /HPF — SIGNIFICANT CHANGE UP (ref 0–5)
WBC UR QL: 2 /HPF — SIGNIFICANT CHANGE UP (ref 0–5)

## 2023-12-04 PROCEDURE — 93010 ELECTROCARDIOGRAM REPORT: CPT

## 2023-12-04 PROCEDURE — 99285 EMERGENCY DEPT VISIT HI MDM: CPT

## 2023-12-04 PROCEDURE — 71045 X-RAY EXAM CHEST 1 VIEW: CPT | Mod: 26

## 2023-12-04 RX ORDER — SODIUM CHLORIDE 9 MG/ML
500 INJECTION INTRAMUSCULAR; INTRAVENOUS; SUBCUTANEOUS ONCE
Refills: 0 | Status: COMPLETED | OUTPATIENT
Start: 2023-12-04 | End: 2023-12-04

## 2023-12-04 RX ADMIN — SODIUM CHLORIDE 500 MILLILITER(S): 9 INJECTION INTRAMUSCULAR; INTRAVENOUS; SUBCUTANEOUS at 18:31

## 2023-12-04 NOTE — ED ADULT NURSE REASSESSMENT NOTE - NS ED NURSE REASSESS COMMENT FT1
PRECEPTOR RN: pt a&ox3 at this time. pt wheeled to restroom and toileted. urine collected and sent. pt sinus mehrdad on monitor. 20g to the right forearm with no redness or swelling. pt pending repeat trop. pt provided with hospital lotion as per request for "dry skin".  primary RN Samina aware. pt appears in NAD, safety maintained. pt respirations even and unlabored with no accessory muscle use.

## 2023-12-04 NOTE — ED PROVIDER NOTE - PHYSICAL EXAMINATION
GENERAL: Not in acute distress, non-toxic appearing  HEAD: normocephalic, atraumatic  HEENT: PERRLA, EOMI, normal conjunctiva, oral mucosa moist, neck supple  CARDIAC: regular rate and rhythm, normal S1 and S2,  no appreciable murmurs  PULM: clear to ascultation bilaterally, no crackles, rales, rhonchi, or wheezing  GI: abdomen nondistended, soft, nontender, no guarding or rebound tenderness  : No CVA tenderness, no suprapubic tenderness  NEURO: alert and oriented x 3, normal speech, no focal motor or sensory deficits, gait normal, no gross neurologic deficit  MSK: No visible deformities, no peripheral edema, calf tenderness/redness/swelling  SKIN: No visible rashes, dry, well-perfused  PSYCH: appropriate mood and affect,

## 2023-12-04 NOTE — ED PROVIDER NOTE - OBJECTIVE STATEMENT
73 year old female with hx of CKD, HTN, hyperparathyroidism, lymphoma, cerebral aneurysm comes into the ED after a near syncopal episode while she was getting blood drawn at her doctors office. She states she was feeling fine this morning but when she was getting her blood drawn she felt light headed, had chills, felt SOB, and had a near syncopal episode and 1 episode of nonbloody vomiting. She states she did not actually lose consciousness and remembers everything. She did not have any vomiting or nausea since. She denies any chest pain, SOB, abd pain, HA, dizziness since this episode. Currently in the ED She states she feels hungry and has no other symptoms however she is slow to respond appears slightly lethargic. She denies any recent fevers, chills, URI symptoms, UTI symptoms.

## 2023-12-04 NOTE — ED ADULT TRIAGE NOTE - HEIGHT IN CM
R hand 3rd finger and wrist with limited ROM and end range pain/bilateral upper extremity Active ROM was WFL (within functional limits)/bilateral  lower extremity Active ROM was WFL (within functional limits)
157.48

## 2023-12-04 NOTE — ED ADULT TRIAGE NOTE - CHIEF COMPLAINT QUOTE
Pt presents to ED via EMS from MD office. Pt was having routine lab work collected when she became lightheaded, short of breath and had an episode of vomiting. Pt denies dizziness, headache or other physical complaints upon arrival. Pt has hx of HTN, HLD, ESRD not on dialysis.

## 2023-12-04 NOTE — ED PROVIDER NOTE - PATIENT PORTAL LINK FT
You can access the FollowMyHealth Patient Portal offered by [facility] by registering at the following website: [email or web address]. By joining [employer]’s FollowMyHealth portal, you will also be able to view your health information using other applications (apps) compatible with our system. You can access the FollowMyHealth Patient Portal offered by [facility] by registering at the following website: [email or web address]. By joining [employer]’s FollowMyHealth portal, you will also be able to view your health information using other applications (apps) compatible with our system. You can access the FollowMyHealth Patient Portal offered by [facility] by registering at the following website: [email or web address]. By joining [employer]’s FollowMyHealth portal, you will also be able to view your health information using other applications (apps) compatible with our system. You can access the FollowMyHealth Patient Portal offered by [facility] by registering at the following website: [email or web address]. By joining [employer]’s FollowMyHealth portal, you will also be able to view your health information using other applications (apps) compatible with our system.

## 2023-12-04 NOTE — ED PROVIDER NOTE - ATTENDING CONTRIBUTION TO CARE
I performed a history and physical exam of the patient and discussed their management with the resident/fellow/ACP/student. I have reviewed the resident/fellow/ACP/student note and agree with the documented findings and plan of care, except as noted. I have personally performed a substantive portion of the visit including all aspects of the medical decision making. My medical decision making and observations are found above. Please refer to any progress notes for updates on clinical course.    73 year old female with hx of CKD not on dialysis, HTN, hyperparathyroidism, lymphoma in remission, cerebral aneurysm, presenting for near syncope. Patient reports she is getting blood drawn at her doctor's office today because she has been feeling unwell but unable to further elaborate and while getting blood drawn she felt lightheaded, short of breath, nauseous with 1 episode of nonbloody emesis with no associated LOC, palpitations, diaphoresis, focal weakness, numbness/tingling, chest pain, leg swelling/pain, hemoptysis, recent travel/bedbound nature, hormone use, or history of blood clots.  Denies any recent fever/chills, diarrhea, cough, rashes, dysuria, or hematuria. No falls/trauma.     Gen: WDWN, NAD, lethargic appearing, afebrile    HEENT: Atraumatic head, PERRLA, EOMI, no nasal discharge, mucous membranes mildly dry, no oropharyngeal edema/erythema/exudates   CV: Bradycardic with RR, +S1/S2, no M/R/G, equal b/l radial pulses 2+  Resp: CTAB, no W/R/R, SPO2 >95% on RA, no increased WOB   GI: Abdomen soft non-distended, NTTP, no masses/organomegaly   MSK/Skin: No CVA tenderness, no open wounds, no bruising, no LE edema/calf tenderness   Neuro: CN2-12 grossly intact, A&Ox3 but slow to respond, MS +5/5 in UE and LE BL, finger to nose smooth but slow, gross sensation intact in UE and LE BL, negative pronator drift   Psych: appropriate mood    MDM:  73 year old female with hx of CKD, HTN, hyperparathyroidism, lymphoma in remission, cerebral aneurysm presenting for near syncope, EKG shows sinus bradycardia to 40s, hemodynamically stable, no FND, lethargic/weak appearing, hemodynamically stable, afebrile. Exam/history concerning for but not limited to vasovagal syncope versus anemia versus metabolic derangement versus symptomatic bradycardia versus ACS versus infectious etiology.  Will monitor patient on telemetry, explore possible sources of infection, obtain basic labs, fluid bolus, cardiac enzymes, chest x-ray and continue to reassess I performed a history and physical exam of the patient and discussed their management with the resident/fellow/ACP/student. I have reviewed the resident/fellow/ACP/student note and agree with the documented findings and plan of care, except as noted. I have personally performed a substantive portion of the visit including all aspects of the medical decision making. My medical decision making and observations are found above. Please refer to any progress notes for updates on clinical course.    73 year old female with hx of CKD not on dialysis, HTN, hyperparathyroidism, lymphoma in remission, cerebral aneurysm, presenting for near syncope. Patient reports she is getting blood drawn at her doctor's office today because she has been feeling unwell but unable to further elaborate and while getting blood drawn she felt lightheaded, short of breath, nauseous with 1 episode of nonbloody emesis with no associated LOC, palpitations, diaphoresis, focal weakness, numbness/tingling, chest pain, leg swelling/pain, hemoptysis, recent travel/bedbound nature, hormone use, or history of blood clots.  Denies any recent fever/chills, diarrhea, cough, rashes, dysuria, or hematuria. No falls/trauma.     Gen: WDWN, NAD, lethargic appearing, afebrile    HEENT: Atraumatic head, PERRLA, EOMI, no nasal discharge, mucous membranes mildly dry, no oropharyngeal edema/erythema/exudates   CV: Bradycardic with RR, +S1/S2, no M/R/G, equal b/l radial pulses 2+  Resp: CTAB, no W/R/R, SPO2 >95% on RA, no increased WOB   GI: Abdomen soft non-distended, NTTP, no masses/organomegaly   MSK/Skin: No CVA tenderness, no open wounds, no bruising, no LE edema/calf tenderness   Neuro: CN2-12 grossly intact, A&Ox3 but slow to respond, MS +5/5 in UE and LE BL, finger to nose smooth but slow, gross sensation intact in UE and LE BL, negative pronator drift   Psych: appropriate mood    MDM:  73 year old female with hx of CKD, HTN, hyperparathyroidism, lymphoma in remission, cerebral aneurysm presenting for near syncope, EKG shows sinus bradycardia to 40s, hemodynamically stable, no FND, lethargic/weak appearing, hemodynamically stable, afebrile. Exam/history concerning for but not limited to vasovagal syncope versus anemia versus metabolic derangement versus symptomatic bradycardia versus ACS versus infectious etiology.  Will monitor patient on telemetry, explore possible sources of infection, obtain basic labs, fluid bolus, cardiac enzymes, chest x-ray and continue to reassess    Further collateral obtained from son at bedside who reports patient is at baseline activity/mental status. Acting her usual self. HR has been stable on telemetry >60

## 2023-12-04 NOTE — ED PROVIDER NOTE - PROGRESS NOTE DETAILS
Surjit Pelayo PGY2:  Spoke with Pt's daughter who states the Pt has been feeling unwell which is why she went to her doctors office today. Reevaluated Pt, she states she is very cold despite being covered in multiple blankets. Vilma Fung, PGY-2 DO:  Rpt troponin stable. Spoke with patient family member and patient is at baseline. The patient and family is agreeable to discharge, the patients remaining labs have non Vilma Fung, PGY-2 DO:  Rpt troponin stable. Spoke with patient family member and patient is at baseline. The patient and family is agreeable to discharge, the patients remaining labs are non actionable. Will DC patient. Vilma Fung, PGY-2 DO:  Patient reassessed. [place] 12.9, patient has been as high as high as 11.9 in the past. Patients family endorse that this her baseline, the patient was given fluids. Patient is agreeable to discharge. Patient told to f/u outpatient in the next 2 days to monitor calcium levels. Family is agreeable with plan.

## 2023-12-04 NOTE — ED ADULT NURSE NOTE - OBJECTIVE STATEMENT
Pt received in no acute distress, brought in for near syncopal episode while getting blood. Pt reports feeling better now. Denies chest pain, nausea, vomiting, and sob. Pt placed on continuous cardiac monitor.

## 2023-12-04 NOTE — ED ADULT NURSE NOTE - NSFALLRISKINTERV_ED_ALL_ED
Assistance OOB with selected safe patient handling equipment if applicable/Assistance with ambulation/Communicate fall risk and risk factors to all staff, patient, and family/Monitor gait and stability/Provide patient with walking aids/Provide visual cue: yellow wristband, yellow gown, etc/Reinforce activity limits and safety measures with patient and family/Call bell, personal items and telephone in reach/Instruct patient to call for assistance before getting out of bed/chair/stretcher/Non-slip footwear applied when patient is off stretcher/[place] to call system/Physically safe environment - no spills, clutter or unnecessary equipment/Purposeful Proactive Rounding/Room/bathroom lighting operational, light cord in reach Assistance OOB with selected safe patient handling equipment if applicable/Assistance with ambulation/Communicate fall risk and risk factors to all staff, patient, and family/Monitor gait and stability/Provide patient with walking aids/Provide visual cue: yellow wristband, yellow gown, etc/Reinforce activity limits and safety measures with patient and family/Call bell, personal items and telephone in reach/Instruct patient to call for assistance before getting out of bed/chair/stretcher/Non-slip footwear applied when patient is off stretcher/[place] to call system/Physically safe environment - no spills, clutter or unnecessary equipment/Purposeful Proactive Rounding/Room/bathroom lighting operational, light cord in reach

## 2023-12-04 NOTE — ED PROVIDER NOTE - NSFOLLOWUPINSTRUCTIONS_ED_ALL_ED_FT
Please follow up with your primary care physician within 2-3 days.   Return to the ER for any new or concerning symptoms.     Drink plenty of fluids and rest.    Near-Syncope  Outline of the head showing blood vessels that supply the brain.  Near-syncope is when you suddenly feel like you might pass out or faint, but you do not actually lose consciousness. This may also be referred to as presyncope. During an episode of near-syncope, you may:  Feel dizzy, weak, light-headed, or like the room is spinning.  Feel nauseous.  See spots or see all white or all black in your field of vision.  Have cold, clammy skin or feel warm and sweaty.  Hear ringing in your ears (tinnitus).  This condition is caused by a sudden decrease in blood flow to the brain. This decrease can result from various causes, but most of those causes are not dangerous. However, near-syncope may be a sign of a serious medical problem, so it is important to seek medical care.    Follow these instructions at home:  Medicines    Take over-the-counter and prescription medicines only as told by your health care provider.  If you are taking blood pressure or heart medicine, get up slowly and take several minutes to sit and then stand. This can reduce dizziness and decrease the risk of near-syncope.  Lifestyle    Do not drive, use machinery, or play sports until your health care provider says it is okay.  Do not drink alcohol.  Do not use any products that contain nicotine or tobacco. These products include cigarettes, chewing tobacco, and vaping devices, such as e-cigarettes. If you need help quitting, ask your health care provider.  Avoid hot tubs and saunas.  General instructions    Pay attention to any changes in your symptoms.  Talk with your health care provider about your symptoms. You may need to have testing to understand the cause of your near-syncope.  If you start to feel like you might faint, sit or lie down right away. If sitting, put your head down between your legs. If lying down, raise (elevate) your feet above the level of your heart.  Breathe deeply and steadily. Wait until all of the symptoms have passed.  Have someone stay with you until you feel stable.  Drink enough fluid to keep your urine pale yellow.  Avoid prolonged standing. If you must stand for a long time, do movements such as:  Moving your legs.  Crossing your legs.  Flexing and stretching your leg muscles.  Squatting.  Keep all follow-up visits. This is important.  Contact a health care provider if:  You continue to have episodes of near fainting.  Get help right away if:  You faint.  You have any of these symptoms that may indicate trouble with your heart:  Fast or irregular heartbeats (palpitations).  Unusual pain in your chest, abdomen, or back.  Shortness of breath.  You have a seizure.  You have a severe headache.  You are confused.  You have vision problems.  You have severe weakness or trouble walking.  You are bleeding from your mouth or rectum, or have black or tarry stool.  These symptoms may represent a serious problem that is an emergency. Do not wait to see if your symptoms will go away. Get medical help right away. Call your local emergency services (911 in the [place] Do not drive yourself to the hospital.    Summary  Near-syncope is when you suddenly feel like you might pass out or faint, but you do not actually lose consciousness.  This condition is caused by a sudden decrease in blood flow to the brain. This decrease can result from various causes, but most of those causes are not dangerous.  Near-syncope may be a sign of a serious medical problem, so it is important to seek medical care.  If you start to feel like you might faint, sit or lie down right away. If sitting, put your head down between your legs. If lying down, raise (elevate) your feet above the level of your heart.  Talk with your health care provider about your symptoms. You may need to have testing to understand the cause of your near-syncope. Please follow up with your primary care physician within 2-3 days.   Return to the ER for any new or concerning symptoms.     Drink plenty of fluids and rest.    Near-Syncope  Outline of the head showing blood vessels that supply the brain.  Near-syncope is when you suddenly feel like you might pass out or faint, but you do not actually lose consciousness. This may also be referred to as presyncope. During an episode of near-syncope, you may:  Feel dizzy, weak, light-headed, or like the room is spinning.  Feel nauseous.  See spots or see all white or all black in your field of vision.  Have cold, clammy skin or feel warm and sweaty.  Hear ringing in your ears (tinnitus).    Hypercalcemia  Hypercalcemia is when the level of calcium in a person's blood is above normal. The body needs calcium to make bones and keep them strong. Calcium also helps the muscles, nerves, brain, and heart work the way they should.    Most of the calcium in the body is stored in the bones. There is also calcium in the blood. Hypercalcemia occurs when there is too much calcium in your blood. Calcium levels in the blood are regulated by hormones, kidneys, and the gastrointestinal tract.    Hypercalcemia can happen when calcium comes out of the bones, or when the kidneys are not able to remove calcium from the blood. Hypercalcemia can be mild or severe.    What are the causes?  There are many possible causes of hypercalcemia. Common causes of this condition include:  Hyperparathyroidism. This is a condition in which the body produces too much parathyroid hormone. There are four parathyroid glands in your neck. These glands produce a chemical messenger (hormone) that helps the body absorb calcium from foods and helps your bones release calcium.  Certain kinds of cancer.  Less common causes of hypercalcemia include:  Calcium and vitamin D dietary supplements.  Chronic kidney disease.  Hyperthyroidism.  Severe dehydration.  Being on bed rest or being inactive for a long time.  Certain medicines.  Infections.  What increases the risk?  You are more likely to develop this condition if:  You are female.  You are 60 years of age or older.  You have a family history of hypercalcemia.  What are the signs or symptoms?  Mild hypercalcemia that starts slowly may not cause symptoms. Severe, sudden hypercalcemia is more likely to cause symptoms, such as:  Being more thirsty than usual.  Needing to urinate more often than usual.  Abdominal pain.  Nausea and vomiting.  Constipation.  Muscle pain, twitching, or weakness.  Feeling very tired.  How is this diagnosed?  A person having a blood sample taken from the arm.  Hypercalcemia is usually diagnosed with a blood test. You may also have tests to help check what is causing this condition. Tests include imaging tests and more blood tests.    How is this treated?  Treatment for hypercalcemia depends on the cause. Treatment may include:  Receiving fluids through an IV.  Medicines. These can be used to:  Keep calcium levels steady after receiving fluids (loop diuretics).  Keep calcium in your bones (bisphosphonates).  Lower the calcium level in your blood.  Surgery to remove overactive parathyroid glands.  A procedure that filters your blood to correct calcium levels (hemodialysis).  Follow these instructions at home:  Three cups showing dark yellow, yellow, and pale yellow urine.  Take over-the-counter and prescription medicines only as told by your health care provider.  Follow instructions from your health care provider about eating or drinking restrictions.  Drink enough fluid to keep your urine pale yellow.  Stay active. Weight-bearing exercise helps to keep calcium in your bones. Follow instructions from your health care provider about what type and level of exercise is safe for you.  Keep all follow-up visits. This is important.  Contact a health care provider if:  You have a fever.  Your heartbeat is irregular or very fast.  You have changes in mood, memory, or personality.  Get help right away if:  You have severe abdominal pain.  You have chest pain.  You have trouble breathing.  You become very confused and sleepy.  You lose consciousness.  These symptoms may represent a serious problem that is an emergency. Do not wait to see if the symptoms will go away. Get medical help right away. Call your local emergency services (911 in the [place] Do not drive yourself to the hospital.    Summary  Hypercalcemia is when the level of calcium in a person's blood is above normal. The body needs calcium to make bones and keep them strong.  There are many possible causes of hypercalcemia, and treatment depends on the cause.  Take over-the-counter and prescription medicines only as told by your health care provider.  This information is not intended to replace advice given to you by your health care provider. Make sure you discuss any questions you have with your health care provider.  This condition is caused by a sudden decrease in blood flow to the brain. This decrease can result from various causes, but most of those causes are not dangerous. However, near-syncope may be a sign of a serious medical problem, so it is important to seek medical care.    Follow these instructions at home:  Medicines    Take over-the-counter and prescription medicines only as told by your health care provider.  If you are taking blood pressure or heart medicine, get up slowly and take several minutes to sit and then stand. This can reduce dizziness and decrease the risk of near-syncope.  Lifestyle    Do not drive, use machinery, or play sports until your health care provider says it is okay.  Do not drink alcohol.  Do not use any products that contain nicotine or tobacco. These products include cigarettes, chewing tobacco, and vaping devices, such as e-cigarettes. If you need help quitting, ask your health care provider.  Avoid hot tubs and saunas.  General instructions    Pay attention to any changes in your symptoms.  Talk with your health care provider about your symptoms. You may need to have testing to understand the cause of your near-syncope.  If you start to feel like you might faint, sit or lie down right away. If sitting, put your head down between your legs. If lying down, raise (elevate) your feet above the level of your heart.  Breathe deeply and steadily. Wait until all of the symptoms have passed.  Have someone stay with you until you feel stable.  Drink enough fluid to keep your urine pale yellow.  Avoid prolonged standing. If you must stand for a long time, do movements such as:  Moving your legs.  Crossing your legs.  Flexing and stretching your leg muscles.  Squatting.  Keep all follow-up visits. This is important.  Contact a health care provider if:  You continue to have episodes of near fainting.  Get help right away if:  You faint.  You have any of these symptoms that may indicate trouble with your heart:  Fast or irregular heartbeats (palpitations).  Unusual pain in your chest, abdomen, or back.  Shortness of breath.  You have a seizure.  You have a severe headache.  You are confused.  You have vision problems.  You have severe weakness or trouble walking.  You are bleeding from your mouth or rectum, or have black or tarry stool.  These symptoms may represent a serious problem that is an emergency. Do not wait to see if your symptoms will go away. Get medical help right away. Call your local emergency services (911 in the [place] Do not drive yourself to the hospital.    Summary  Near-syncope is when you suddenly feel like you might pass out or faint, but you do not actually lose consciousness.  This condition is caused by a sudden decrease in blood flow to the brain. This decrease can result from various causes, but most of those causes are not dangerous.  Near-syncope may be a sign of a serious medical problem, so it is important to seek medical care.  If you start to feel like you might faint, sit or lie down right away. If sitting, put your head down between your legs. If lying down, raise (elevate) your feet above the level of your heart.  Talk with your health care provider about your symptoms. You may need to have testing to understand the cause of your near-syncope.

## 2023-12-05 VITALS
HEART RATE: 60 BPM | OXYGEN SATURATION: 100 % | SYSTOLIC BLOOD PRESSURE: 175 MMHG | TEMPERATURE: 98 F | RESPIRATION RATE: 16 BRPM | DIASTOLIC BLOOD PRESSURE: 88 MMHG

## 2024-08-25 NOTE — ED CDU PROVIDER SUBSEQUENT DAY NOTE - HEME/LYMPH [+], MLM
Thank you for coming to the ER today.    Follow up with your regular doctor (or the doctor listed in your discharge instructions) in 1-2 days. Call to make an appointment.     Return to the ER immediately for worsening symptoms of facial swelling and dental pain, or new fevers, headache, chest pain, shortness of breath, vomiting, abdominal pain, weakness, or other symptoms as discussed in the ER today.      h/o non-hodgkins

## 2025-05-28 NOTE — H&P PST ADULT - BREASTS
Kylah Gomez  5/28/2025  1:54 PM      No chief complaint on file.         Wt Readings from Last 3 Encounters:   05/28/25 58.4 kg (128 lb 12.8 oz)   05/21/25 57.9 kg (127 lb 9.6 oz)   04/30/25 56.8 kg (125 lb 3.2 oz)       Comments: Dose of 6000 cGy to the right forehead.    Patient is doing well without any complaints.    She denies any headache nausea or vomiting.  She has no pain.        On examination the skin over the treated area is slightly red.    No ulceration was seen.    Patient has 5 more fractions to go.    Patient is tolerating treatment well      Plan: Radiation to continue as planned      Electronically signed by Conner Stephens MD on 5/28/25 at 1:54 PM EDT               detailed exam